# Patient Record
Sex: MALE | Race: WHITE | NOT HISPANIC OR LATINO | Employment: FULL TIME | URBAN - METROPOLITAN AREA
[De-identification: names, ages, dates, MRNs, and addresses within clinical notes are randomized per-mention and may not be internally consistent; named-entity substitution may affect disease eponyms.]

---

## 2017-02-22 ENCOUNTER — TELEPHONE (OUTPATIENT)
Dept: FAMILY MEDICINE | Facility: CLINIC | Age: 62
End: 2017-02-22

## 2017-02-22 DIAGNOSIS — Z12.11 SCREEN FOR COLON CANCER: Primary | ICD-10-CM

## 2017-02-22 NOTE — TELEPHONE ENCOUNTER
I put in order for procedure.  Given him the numbers listed [facility] and [facility].    Thanks    Tim Soliman MD

## 2017-02-22 NOTE — TELEPHONE ENCOUNTER
Panel Management Review      Patient has the following on his problem list:     Diabetes    ASA: Failed    Last A1C  Lab Results   Component Value Date    A1C 12.5 03/14/2016    A1C 10.8 02/27/2015     A1C tested: Failed    Last LDL:    Lab Results   Component Value Date    CHOL 221 02/27/2015     Lab Results   Component Value Date    HDL 99 02/27/2015     Lab Results   Component Value Date     02/27/2015     Lab Results   Component Value Date    TRIG 71 02/27/2015     No results found for: CHOLHDLRATIO  No results found for: NHDL    Is the patient on a Statin? NO             Is the patient on Aspirin? YES    Medications     Salicylates    aspirin 81 MG tablet          Last three blood pressure readings:  BP Readings from Last 3 Encounters:   04/25/16 135/82   03/28/16 112/71   03/14/16 129/80       Date of last diabetes office visit: [facility]     Tobacco History:     History   Smoking Status     Never Smoker   Smokeless Tobacco     Not on file           Composite cancer screening  Chart review shows that this patient is due/due soon for the following Colonoscopy  Summary:    Patient is due/failing the following:   COLONOSCOPY    Action needed:   Patient needs referral/order: colonoscopy order pended    Type of outreach:    Phone, spoke to patient.  he will call and schedule when he figures out his work schedule.    Questions for provider review:    Please sign colonoscopy referral. Thanks                                                                                                                                    Luz Elena Gomez [facility]       Chart routed to Provider .

## 2017-02-28 ENCOUNTER — OFFICE VISIT (OUTPATIENT)
Dept: FAMILY MEDICINE | Facility: CLINIC | Age: 62
End: 2017-02-28
Payer: COMMERCIAL

## 2017-02-28 VITALS
SYSTOLIC BLOOD PRESSURE: 124 MMHG | WEIGHT: 175 LBS | OXYGEN SATURATION: 98 % | TEMPERATURE: 97.3 F | BODY MASS INDEX: 23.19 KG/M2 | HEART RATE: 82 BPM | DIASTOLIC BLOOD PRESSURE: 75 MMHG | HEIGHT: 73 IN

## 2017-02-28 DIAGNOSIS — Z79.4 TYPE 2 DIABETES MELLITUS WITHOUT COMPLICATION, WITH LONG-TERM CURRENT USE OF INSULIN (H): ICD-10-CM

## 2017-02-28 DIAGNOSIS — J06.9 UPPER RESPIRATORY TRACT INFECTION, UNSPECIFIED TYPE: Primary | ICD-10-CM

## 2017-02-28 DIAGNOSIS — E11.9 TYPE 2 DIABETES MELLITUS WITHOUT COMPLICATION, WITH LONG-TERM CURRENT USE OF INSULIN (H): ICD-10-CM

## 2017-02-28 PROCEDURE — 99213 OFFICE O/P EST LOW 20 MIN: CPT | Performed by: PHYSICIAN ASSISTANT

## 2017-02-28 NOTE — PROGRESS NOTES
"  SUBJECTIVE:                                                    José Manuel Jernigan is a 61 year old male who presents to clinic today for the following health issues:      Acute Illness   Acute illness concerns: Cough  Onset: 4 days    Fever: no     Chills/Sweats: YES    Headache (location?): YES    Sinus Pressure:YES    Conjunctivitis:  no    Ear Pain: no    Rhinorrhea: YES    Congestion: YES    Sore Throat: no     Cough: YES-productive of yellow sputum    Wheeze: YES    Decreased Appetite: YES    Nausea: YES    Vomiting: no    Diarrhea:  no    Dysuria/Freq.: no    Fatigue/Achiness: YES    Sick/Strep Exposure: no     Therapies Tried and outcome: Rest and fluids   Moved into chest right away   No hx of lung problems.    Dizzy and nausea-feeling off.  Nausea was seperate.  No appetite   Tried asa once  Sugars high, always high with illness.  Not going low.          Problem list and histories reviewed & adjusted, as indicated.  Additional history: as documented    Patient Active Problem List   Diagnosis     Advanced directives, counseling/discussion     History of diabetes mellitus     Type 2 diabetes mellitus without complication, with long-term current use of insulin (H)     Past Surgical History   Procedure Laterality Date     Ent surgery       Tonsillectomy       Social History   Substance Use Topics     Smoking status: Never Smoker     Smokeless tobacco: Not on file     Alcohol use Yes     History reviewed. No pertinent family history.        Reviewed and updated as needed this visit by clinical staff  Tobacco  Allergies  Meds  Med Hx  Surg Hx  Fam Hx  Soc Hx      Reviewed and updated as needed this visit by Provider         ROS:  As above    OBJECTIVE:                                                    /75 (BP Location: Left arm, Patient Position: Chair, Cuff Size: Adult Regular)  Pulse 82  Temp 97.3  F (36.3  C) (Oral)  Ht 6' 1\" (1.854 m)  Wt 175 lb (79.4 kg)  SpO2 98%  BMI 23.09 kg/m2  Body mass " index is 23.09 kg/(m^2).  GENERAL: healthy, alert and no distress  HENT: both ears: occluded with wax, oropharynx clear, oral mucous membranes moist and sinuses: not tender  NECK: no adenopathy and no asymmetry, masses, or scars  RESP: lungs clear to auscultation - no rales, rhonchi or wheezes  CV: regular rates and rhythm, normal S1 S2, no S3 or S4 and no murmur, click or rub    Diagnostic Test Results:  none      ASSESSMENT/PLAN:                                                      1. Upper respiratory tract infection, unspecified type  Continue symptomatic treatment.  return to clinic if not improving.       2. Type 2 diabetes mellitus without complication, with long-term current use of insulin (H)  [facility]  Per pt sugars not low as cause of dizziness.            Dottie Grewal PA-C  [facility]

## 2017-02-28 NOTE — LETTER
[address] [address]  Phone: [phone]    February 28, 2017        José Manuel Jernigan  [address] [facility] [address]          To whom it may concern:    RE: José Manuel Jernigan    Patient was seen and treated today at our clinic and missed work.    Please contact me for questions or concerns.      Sincerely,        Dottie Grewal PA-C

## 2017-02-28 NOTE — NURSING NOTE
"Chief Complaint   Patient presents with     URI       Initial /75 (BP Location: Left arm, Patient Position: Chair, Cuff Size: Adult Regular)  Pulse 82  Temp 97.3  F (36.3  C) (Oral)  Ht 6' 1\" (1.854 m)  Wt 175 lb (79.4 kg)  SpO2 98%  BMI 23.09 kg/m2 Estimated body mass index is 23.09 kg/(m^2) as calculated from the following:    Height as of this encounter: 6' 1\" (1.854 m).    Weight as of this encounter: 175 lb (79.4 kg).  Medication Reconciliation: complete   Cecy Gonzalez CMA       "

## 2017-02-28 NOTE — MR AVS SNAPSHOT
"              After Visit Summary   2017    José Manuel Jernigan    MRN: [id number]           Patient Information     Date Of Birth          1955        Visit Information        Provider Department      2017 5:40 PM Dottie Grewal PA-C [facility]        Today's Diagnoses     Upper respiratory tract infection, unspecified type    -  1    Type 2 diabetes mellitus without complication, with long-term current use of insulin (H)           Follow-ups after your visit        Who to contact     If you have questions or need follow up information about today's clinic visit or your schedule please contact [facility] directly at [phone].  Normal or non-critical lab and imaging results will be communicated to you by MyChart, letter or phone within 4 business days after the clinic has received the results. If you do not hear from us within 7 days, please contact the clinic through MyChart or phone. If you have a critical or abnormal lab result, we will notify you by phone as soon as possible.  Submit refill requests through [employer] or call your pharmacy and they will forward the refill request to us. Please allow 3 business days for your refill to be completed.          Additional Information About Your Visit        MyChart Information     [employer] lets you send messages to your doctor, view your test results, renew your prescriptions, schedule appointments and more. To sign up, go to [email or web address] . Click on \"Log in\" on the left side of the screen, which will take you to the Welcome page. Then click on \"Sign up Now\" on the right side of the page.     You will be asked to enter the access code listed below, as well as some personal information. Please follow the directions to create your username and password.     Your access code is: [email or web address]  Expires: 2017  6:11 PM     Your access code will  in 90 days. If you need help or a new " "code, please call your [facility] or [phone].        Care EveryWhere ID     This is your Care EveryWhere ID. This could be used by other organizations to access your [facility] medical records  [id number]        Your Vitals Were     Pulse Temperature Height Pulse Oximetry BMI (Body Mass Index)       82 97.3  F (36.3  C) (Oral) 6' 1\" (1.854 m) 98% 23.09 kg/m2        Blood Pressure from Last 3 Encounters:   02/28/17 124/75   04/25/16 135/82   03/28/16 112/71    Weight from Last 3 Encounters:   02/28/17 175 lb (79.4 kg)   04/25/16 185 lb (83.9 kg)   03/28/16 190 lb (86.2 kg)              Today, you had the following     No orders found for display       Primary Care Provider Office Phone # Fax #    Tim Soliman [phone]       [facility] [address] [facility] [address]        Thank you!     Thank you for choosing [facility]  for your care. Our goal is always to provide you with excellent care. Hearing back from our patients is one way we can continue to improve our services. Please take a few minutes to complete the written survey that you may receive in the mail after your visit with us. Thank you!             Your Updated Medication List - Protect others around you: Learn how to safely use, store and throw away your medicines at www.disposemymeds.org.          This list is accurate as of: 2/28/17  6:11 PM.  Always use your most recent med list.                   Brand Name Dispense Instructions for use    aspirin 81 MG tablet     90 tablet    Take 1 tablet (81 mg) by mouth daily       INSULIN ASPART SC      26 units at bedtime unsure of which insulin though       MULTIVITAMIN PO      Take by mouth daily         "

## 2017-06-14 ENCOUNTER — TELEPHONE (OUTPATIENT)
Dept: FAMILY MEDICINE | Facility: CLINIC | Age: 62
End: 2017-06-14

## 2017-06-14 NOTE — LETTER
June 14, 2017    José Manuel Mejiaolegariojose d  [address] [facility] [address]    Dear José Manuel,    I care about your health and have reviewed your health plan. I have reviewed your medical conditions, medication list, and lab results and am making recommendations based on this review, to better manage your health.    You are in particular need of attention regarding:  -Diabetes    I am recommending that you:  -Please sign this Release of Information form.  This will allow us to get [facility] so we can also update our system for your Diabetic Health.   Please mail the completed for back to us.    Here is a list of Health Maintenance topics that are due now or due soon:  Health Maintenance Due   Topic Date Due     Diabetic Foot Exam - yearly  11/23/1956     Eye Exam - yearly  11/23/1956     Diabetic Education - yearly  11/23/1956     Microalbumin Lab - yearly  11/23/1956     Hepatitis C Screening  11/23/1973     Tetanus Vaccine - every 10 years  04/03/2006     Pneumonia Vaccine (1) 12/23/2015     Cholesterol Lab - yearly  02/27/2016     A1C (Diabetes) Lab - every 6 months  09/14/2016     Colon Cancer Screening - every 10 years.  01/01/2017     Thyroid Function Lab (TSH) - every 2 years  03/13/2017     Creatinine Lab - yearly  05/08/2017   Please call us at [phone] (or use [employer]) to address the above recommendations.     Thank you for trusting [facility] and we appreciate the opportunity to serve you.  We look forward to supporting your healthcare needs in the future.    Healthy Regards,    DINA VelasquezL

## 2017-06-22 ENCOUNTER — TELEPHONE (OUTPATIENT)
Dept: FAMILY MEDICINE | Facility: CLINIC | Age: 62
End: 2017-06-22

## 2017-06-22 DIAGNOSIS — Z12.11 SCREEN FOR COLON CANCER: Primary | ICD-10-CM

## 2017-06-22 NOTE — TELEPHONE ENCOUNTER
Reason for call:  Patient reporting a symptom    Symptom or request: Abdominal pain    Additional comments: TOBIAS Baxter triaging patient.    Phone Number patient can be reached at:  Home number on file [phone] (home)        Call taken on 6/22/2017 at 12:44 PM by Darlin Kramer

## 2017-06-22 NOTE — TELEPHONE ENCOUNTER
Patient called and was triaged for abdominal pain.   I month abdominal pain, discussed red flags to watch for and scheduled for Monday with Coni Jensen.    Patient also notes he is due for colonoscopy.    He has no strong opinion on where he goes.    Routed to Dr. Soliman for colonoscopy referral.    Isabelle López RN  [facility]

## 2017-06-22 NOTE — TELEPHONE ENCOUNTER
I did referral.  Please give patient the number to call, listed in referral.  Also advise him he will want to hold his aspirin for one week prior to test and hold the insulin night before test is done.    Thanks.    Tim Soliman MD

## 2017-06-22 NOTE — TELEPHONE ENCOUNTER
Red flag call taken, patient reports abdominal pain level 5-6 out of 10 with bloating and tightness for the past month.   Initially was more intermittent, feels bloated for a couple of hours, pain is located in upper abdomen, states feels like gas but he is not belching.   Does complain of feeling dizzy and nauseated at times.   Pain is more constant now for the past 2 weeks.   Denies vomiting, fever, diarrhea or constipation.   Is able to eat and drink, can carry on with his day and is not doubled over in pain.   Is just bothersome.   Reports fatigue, thinks due to the length of time he's been dealing with this.   Denies pain in chest, upper abdomen is tight.   Denies pain radiating down legs or in jaw or shoulder.   States he has not tried antacids as does not seem to be gas.   Drinking water makes him feel better.  Eating does not really affect it.    He is not interested in coming in today, has availability on Monday or Tuesday.   Scheduled.  Advised him to call 911 if acutely worse, hard to walk, vomiting or stooling blood.   Seek evaluation in ER with someone else to drive if increased weakness, not urinating at least once in 8-12 hours.       After our conversation, I do see he has history of liver issues and GERD, see note from [facility] from April 2016.    Patient verbalized understanding of and agreement with plan.  Source:  Telephone Triage Protocols for Nurses, [place], 5th ed, abdominal pain, adult    Isabelle López, RN  [facility]

## 2017-06-23 NOTE — TELEPHONE ENCOUNTER
Spoke with patient and informed.  He is scheduled for September, due to schedule and transportation.

## 2017-06-26 ENCOUNTER — OFFICE VISIT (OUTPATIENT)
Dept: FAMILY MEDICINE | Facility: CLINIC | Age: 62
End: 2017-06-26
Payer: COMMERCIAL

## 2017-06-26 VITALS
BODY MASS INDEX: 24.01 KG/M2 | SYSTOLIC BLOOD PRESSURE: 138 MMHG | HEART RATE: 73 BPM | OXYGEN SATURATION: 100 % | WEIGHT: 182 LBS | TEMPERATURE: 98.2 F | DIASTOLIC BLOOD PRESSURE: 83 MMHG

## 2017-06-26 DIAGNOSIS — Z79.4 TYPE 2 DIABETES MELLITUS WITHOUT COMPLICATION, WITH LONG-TERM CURRENT USE OF INSULIN (H): ICD-10-CM

## 2017-06-26 DIAGNOSIS — R10.84 ABDOMINAL PAIN, GENERALIZED: Primary | ICD-10-CM

## 2017-06-26 DIAGNOSIS — E11.9 TYPE 2 DIABETES MELLITUS WITHOUT COMPLICATION, WITH LONG-TERM CURRENT USE OF INSULIN (H): ICD-10-CM

## 2017-06-26 DIAGNOSIS — L21.0 PITYRIASIS: ICD-10-CM

## 2017-06-26 PROCEDURE — 99214 OFFICE O/P EST MOD 30 MIN: CPT | Performed by: PHYSICIAN ASSISTANT

## 2017-06-26 ASSESSMENT — PAIN SCALES - GENERAL: PAINLEVEL: MODERATE PAIN (5)

## 2017-06-26 NOTE — PROGRESS NOTES
SUBJECTIVE:                                                    José Manuel Jernigan is a 61 year old male who presents to clinic today for the following health issues:      Concern - Follow-up Abdominal Pain   Onset: 1 month    Description:   Pt said that his pain is still the same and that nothing has changed. Pt is having some nausea and pressure in his abdominal area. His bowel movements have been normal. Pt said that he has been also having no energy which is unusual for him.     Intensity: moderate    Progression of Symptoms:  same    Accompanying Signs & Symptoms:  Pt stated that is is worse in the morning.    Previous history of similar problem:   none    Precipitating factors:   Worsened by: nothing    Alleviating factors:  Improved by: Drinking water    Therapies Tried and outcome: None.    It started out of the blue like he had gas. The pressure radiates around the whole belly.   Feels gassy, but no excessive gas.   No affect from food. Less pressure with drinking water.   Has not tried any over the counter meds.   Soft BM every other day. No blood in the stool.   Increase in BS since feeling bad.   [facility] He does not recall his last A1C. Reports his diabetes has been stable. Our last A1C was elevated here. [facility]sa not been [facility] by the [facility] in about a year though.       Problem list and histories reviewed & adjusted, as indicated.  Additional history: as documented    Patient Active Problem List   Diagnosis     Advanced directives, counseling/discussion     History of diabetes mellitus     Type 2 diabetes mellitus without complication, with long-term current use of insulin (H)     Past Surgical History:   Procedure Laterality Date     ENT SURGERY      Tonsillectomy       Social History   Substance Use Topics     Smoking status: Never Smoker     Smokeless tobacco: Not on file     Alcohol use Yes     History reviewed. No pertinent family history.        Reviewed and updated as needed this visit by  clinical staff  Tobacco  Allergies  Meds  Med Hx  Surg Hx  Fam Hx  Soc Hx      Reviewed and updated as needed this visit by Provider         ROS:  Constitutional, HEENT, cardiovascular, pulmonary, gi and gu systems are negative, except as otherwise noted.    OBJECTIVE:     /83 (BP Location: Left arm, Patient Position: Chair, Cuff Size: Adult Regular)  Pulse 73  Temp 98.2  F (36.8  C) (Oral)  Wt 182 lb (82.6 kg)  SpO2 100%  BMI 24.01 kg/m2  Body mass index is 24.01 kg/(m^2).  GENERAL: healthy, alert and no distress  ABDOMEN: soft, nontender, no hepatosplenomegaly, no masses and bowel sounds normal  SKIN: salmon colored macules noted across abdomen, chest and lower back. No obvious herald patch.     Diagnostic Test Results:  none     ASSESSMENT/PLAN:       ICD-10-CM    1. Abdominal pain, generalized R10.84 ranitidine (ZANTAC) 150 MG tablet   2. Type 2 diabetes mellitus without complication, with long-term current use of insulin (H) E11.9     Z79.4    3. Pityriasis L21.0    Rash is new, patient had not noticed this prior. Pityriasis rosea vs versicolor. Will monitor for now. If no resolution in 4 weeks can consider starting treatment for versicolor.   Abdominal pain is persistent, has not tried any medications. Benign exam. Try zantac, need to consider gastroparesis as a possible cause as well with his history of uncontrolled DM.     FUTURE APPOINTMENTS:       - Follow-up visit in 2 weeks if not improving.     Coni Jensen PA-C  [facility]

## 2017-06-26 NOTE — PATIENT INSTRUCTIONS
If not improving with the zantac in 2 weeks return to clinic      Watch rash, if not gone in 4 weeks,w e should try a fungal cream.

## 2017-06-26 NOTE — NURSING NOTE
"Chief Complaint   Patient presents with     Bloated     recheck     Health Maintenance     Foot Exam, Eye Exam, Diabetic Ed, Microalbumin, Hep C, Tetanus, Pneumo, Lipid, A1C, Colonoscopy, TSH, and Creatinine       Initial /83 (BP Location: Left arm, Patient Position: Chair, Cuff Size: Adult Regular)  Pulse 73  Temp 98.2  F (36.8  C) (Oral)  Wt 182 lb (82.6 kg)  SpO2 100%  BMI 24.01 kg/m2 Estimated body mass index is 24.01 kg/(m^2) as calculated from the following:    Height as of 2/28/17: 6' 1\" (1.854 m).    Weight as of this encounter: 182 lb (82.6 kg).  Medication Reconciliation: romeo Samayoa MA      "

## 2017-06-26 NOTE — MR AVS SNAPSHOT
"              After Visit Summary   6/26/2017    José Manuel Jernigan    MRN: [id number]           Patient Information     Date Of Birth          1955        Visit Information        Provider Department      6/26/2017 10:20 AM Coni Jensen PA-C [facility]        Today's Diagnoses     Abdominal pain, generalized    -  1    Type 2 diabetes mellitus without complication, with long-term current use of insulin (H)          Care Instructions    If not improving with the zantac in 2 weeks return to clinic      Watch rash, if not gone in 4 weeks,w e should try a fungal cream.           Follow-ups after your visit        Who to contact     If you have questions or need follow up information about today's clinic visit or your schedule please contact [facility] directly at [phone].  Normal or non-critical lab and imaging results will be communicated to you by MyChart, letter or phone within 4 business days after the clinic has received the results. If you do not hear from us within 7 days, please contact the clinic through MyChart or phone. If you have a critical or abnormal lab result, we will notify you by phone as soon as possible.  Submit refill requests through [employer] or call your pharmacy and they will forward the refill request to us. Please allow 3 business days for your refill to be completed.          Additional Information About Your Visit        MyChart Information     [employer] lets you send messages to your doctor, view your test results, renew your prescriptions, schedule appointments and more. To sign up, go to [email or web address] . Click on \"Log in\" on the left side of the screen, which will take you to the Welcome page. Then click on \"Sign up Now\" on the right side of the page.     You will be asked to enter the access code listed below, as well as some personal information. Please follow the directions to create your username and password.   "   Your access code is: [email or web address]  Expires: 2017 10:43 AM     Your access code will  in 90 days. If you need help or a new code, please call your [facility] clinic or [phone].        Care EveryWhere ID     This is your Care EveryWhere ID. This could be used by other organizations to access your [facility] medical records  [id number]        Your Vitals Were     Pulse Temperature Pulse Oximetry BMI (Body Mass Index)          73 98.2  F (36.8  C) (Oral) 100% 24.01 kg/m2         Blood Pressure from Last 3 Encounters:   17 138/83   17 124/75   16 135/82    Weight from Last 3 Encounters:   17 182 lb (82.6 kg)   17 175 lb (79.4 kg)   16 185 lb (83.9 kg)              Today, you had the following     No orders found for display         Today's Medication Changes          These changes are accurate as of: 17 10:43 AM.  If you have any questions, ask your nurse or doctor.               Start taking these medicines.        Dose/Directions    ranitidine 150 MG tablet   Commonly known as:  ZANTAC   Used for:  Abdominal pain, generalized   Started by:  Coni Jensen PA-C        Dose:  150 mg   Take 1 tablet (150 mg) by mouth 2 times daily   Quantity:  60 tablet   Refills:  1            Where to get your medicines      These medications were sent to [facility] [place], [place] - [address]  [address] [place] [facility] [address]     Phone:  [phone]     ranitidine 150 MG tablet                Primary Care Provider Office Phone # Fax #    Tim Soliman [phone]       [facility] [address] [facility] [address]        Equal Access to Services     TASHA WILLETT : Qing Julien, waericda luqadaha, qaybta kaalmada dmitri, karla gordon. So [facility] [phone].    ATENCIÓN: Si habla español, tiene a akins disposición servicios gratuitos de  asistencia lingüística. Hemanth al [phone].    We comply with applicable federal civil rights laws and [place] laws. We do not discriminate on the basis of race, color, national origin, age, disability sex, sexual orientation or gender identity.            Thank you!     Thank you for choosing [facility]  for your care. Our goal is always to provide you with excellent care. Hearing back from our patients is one way we can continue to improve our services. Please take a few minutes to complete the written survey that you may receive in the mail after your visit with us. Thank you!             Your Updated Medication List - Protect others around you: Learn how to safely use, store and throw away your medicines at www.disposemymeds.org.          This list is accurate as of: 6/26/17 10:43 AM.  Always use your most recent med list.                   Brand Name Dispense Instructions for use Diagnosis    aspirin 81 MG tablet     90 tablet    Take 1 tablet (81 mg) by mouth daily    Health care maintenance       INSULIN ASPART SC      26 units at bedtime unsure of which insulin though        MULTIVITAMIN PO      Take by mouth daily        ranitidine 150 MG tablet    ZANTAC    60 tablet    Take 1 tablet (150 mg) by mouth 2 times daily    Abdominal pain, generalized

## 2017-07-12 ENCOUNTER — RADIANT APPOINTMENT (OUTPATIENT)
Dept: GENERAL RADIOLOGY | Facility: CLINIC | Age: 62
End: 2017-07-12
Attending: FAMILY MEDICINE
Payer: COMMERCIAL

## 2017-07-12 ENCOUNTER — OFFICE VISIT (OUTPATIENT)
Dept: FAMILY MEDICINE | Facility: CLINIC | Age: 62
End: 2017-07-12
Payer: COMMERCIAL

## 2017-07-12 VITALS
SYSTOLIC BLOOD PRESSURE: 116 MMHG | OXYGEN SATURATION: 99 % | WEIGHT: 181.13 LBS | BODY MASS INDEX: 23.9 KG/M2 | DIASTOLIC BLOOD PRESSURE: 70 MMHG | HEART RATE: 83 BPM | TEMPERATURE: 97.6 F

## 2017-07-12 DIAGNOSIS — Z79.4 TYPE 2 DIABETES MELLITUS WITHOUT COMPLICATION, WITH LONG-TERM CURRENT USE OF INSULIN (H): ICD-10-CM

## 2017-07-12 DIAGNOSIS — R63.4 LOSS OF WEIGHT: ICD-10-CM

## 2017-07-12 DIAGNOSIS — E78.5 HYPERLIPIDEMIA LDL GOAL <100: ICD-10-CM

## 2017-07-12 DIAGNOSIS — Z11.9 SCREENING EXAMINATION FOR INFECTIOUS DISEASE: ICD-10-CM

## 2017-07-12 DIAGNOSIS — E11.9 TYPE 2 DIABETES MELLITUS WITHOUT COMPLICATION, WITH LONG-TERM CURRENT USE OF INSULIN (H): ICD-10-CM

## 2017-07-12 DIAGNOSIS — R53.83 FATIGUE, UNSPECIFIED TYPE: ICD-10-CM

## 2017-07-12 DIAGNOSIS — G47.00 INSOMNIA, UNSPECIFIED TYPE: Primary | ICD-10-CM

## 2017-07-12 LAB
ALBUMIN SERPL-MCNC: 3.9 G/DL (ref 3.4–5)
ALP SERPL-CCNC: 82 U/L (ref 40–150)
ALT SERPL W P-5'-P-CCNC: 23 U/L (ref 0–70)
ANION GAP SERPL CALCULATED.3IONS-SCNC: 6 MMOL/L (ref 3–14)
AST SERPL W P-5'-P-CCNC: 9 U/L (ref 0–45)
BASOPHILS # BLD AUTO: 0 10E9/L (ref 0–0.2)
BASOPHILS NFR BLD AUTO: 0.7 %
BILIRUB SERPL-MCNC: 0.4 MG/DL (ref 0.2–1.3)
BUN SERPL-MCNC: 13 MG/DL (ref 7–30)
CALCIUM SERPL-MCNC: 8.8 MG/DL (ref 8.5–10.1)
CHLORIDE SERPL-SCNC: 101 MMOL/L (ref 94–109)
CHOLEST SERPL-MCNC: 231 MG/DL
CO2 SERPL-SCNC: 29 MMOL/L (ref 20–32)
CREAT SERPL-MCNC: 0.72 MG/DL (ref 0.66–1.25)
DIFFERENTIAL METHOD BLD: NORMAL
EOSINOPHIL # BLD AUTO: 0.1 10E9/L (ref 0–0.7)
EOSINOPHIL NFR BLD AUTO: 2.8 %
ERYTHROCYTE [DISTWIDTH] IN BLOOD BY AUTOMATED COUNT: 12.8 % (ref 10–15)
GFR SERPL CREATININE-BSD FRML MDRD: ABNORMAL ML/MIN/1.7M2
GLUCOSE SERPL-MCNC: 382 MG/DL (ref 70–99)
HBA1C MFR BLD: 10.9 % (ref 4.3–6)
HCT VFR BLD AUTO: 45.3 % (ref 40–53)
HCV AB SERPL QL IA: NORMAL
HDLC SERPL-MCNC: 85 MG/DL
HGB BLD-MCNC: 15.1 G/DL (ref 13.3–17.7)
LDLC SERPL CALC-MCNC: 129 MG/DL
LYMPHOCYTES # BLD AUTO: 1.3 10E9/L (ref 0.8–5.3)
LYMPHOCYTES NFR BLD AUTO: 27.5 %
MCH RBC QN AUTO: 30.2 PG (ref 26.5–33)
MCHC RBC AUTO-ENTMCNC: 33.3 G/DL (ref 31.5–36.5)
MCV RBC AUTO: 91 FL (ref 78–100)
MONOCYTES # BLD AUTO: 0.6 10E9/L (ref 0–1.3)
MONOCYTES NFR BLD AUTO: 13.5 %
NEUTROPHILS # BLD AUTO: 2.5 10E9/L (ref 1.6–8.3)
NEUTROPHILS NFR BLD AUTO: 55.5 %
NONHDLC SERPL-MCNC: 146 MG/DL
PLATELET # BLD AUTO: 250 10E9/L (ref 150–450)
POTASSIUM SERPL-SCNC: 4.4 MMOL/L (ref 3.4–5.3)
PROT SERPL-MCNC: 7 G/DL (ref 6.8–8.8)
RBC # BLD AUTO: 5 10E12/L (ref 4.4–5.9)
SODIUM SERPL-SCNC: 136 MMOL/L (ref 133–144)
TRIGL SERPL-MCNC: 85 MG/DL
TSH SERPL DL<=0.005 MIU/L-ACNC: 1.12 MU/L (ref 0.4–4)
WBC # BLD AUTO: 4.6 10E9/L (ref 4–11)

## 2017-07-12 PROCEDURE — 36415 COLL VENOUS BLD VENIPUNCTURE: CPT | Performed by: FAMILY MEDICINE

## 2017-07-12 PROCEDURE — 80061 LIPID PANEL: CPT | Performed by: FAMILY MEDICINE

## 2017-07-12 PROCEDURE — 86803 HEPATITIS C AB TEST: CPT | Performed by: FAMILY MEDICINE

## 2017-07-12 PROCEDURE — 83036 HEMOGLOBIN GLYCOSYLATED A1C: CPT | Performed by: FAMILY MEDICINE

## 2017-07-12 PROCEDURE — 71020 XR CHEST 2 VW: CPT

## 2017-07-12 PROCEDURE — 99214 OFFICE O/P EST MOD 30 MIN: CPT | Performed by: FAMILY MEDICINE

## 2017-07-12 PROCEDURE — 80050 GENERAL HEALTH PANEL: CPT | Performed by: FAMILY MEDICINE

## 2017-07-12 ASSESSMENT — PAIN SCALES - GENERAL: PAINLEVEL: NO PAIN (0)

## 2017-07-12 NOTE — LETTER
[facility]  [address] [place]  [address]  [phone]        July 17, 2017    José Manuel Jernigan  [address] [facility] [address]        Dear José Manuel,    Your cholesterol is moderately high.  Consider starting medications for this.  If interested, let me know.     The hemoglobin a1c we checked is very high.  Advise follow up with your diabetes provider soon.     Other labs are okay.     Follow up in clinic if symptoms not resolving.     Results for orders placed or performed in visit on 07/12/17   Hepatitis C antibody   Result Value Ref Range    Hepatitis C Antibody  NR     Nonreactive   Assay performance characteristics have not been established for newborns,   infants, and children     Lipid panel reflex to direct LDL   Result Value Ref Range    Cholesterol 231 (H) <200 mg/dL    Triglycerides 85 <150 mg/dL    HDL Cholesterol 85 >39 mg/dL    LDL Cholesterol Calculated 129 (H) <100 mg/dL    Non HDL Cholesterol 146 (H) <130 mg/dL   Hemoglobin A1c   Result Value Ref Range    Hemoglobin A1C 10.9 (H) 4.3 - 6.0 %   TSH with free T4 reflex   Result Value Ref Range    TSH 1.12 0.40 - 4.00 mU/L   CBC with platelets differential   Result Value Ref Range    WBC 4.6 4.0 - 11.0 10e9/L    RBC Count 5.00 4.4 - 5.9 10e12/L    Hemoglobin 15.1 13.3 - 17.7 g/dL    Hematocrit 45.3 40.0 - 53.0 %    MCV 91 78 - 100 fl    MCH 30.2 26.5 - 33.0 pg    MCHC 33.3 31.5 - 36.5 g/dL    RDW 12.8 10.0 - 15.0 %    Platelet Count 250 150 - 450 10e9/L    Diff Method Automated Method     % Neutrophils 55.5 %    % Lymphocytes 27.5 %    % Monocytes 13.5 %    % Eosinophils 2.8 %    % Basophils 0.7 %    Absolute Neutrophil 2.5 1.6 - 8.3 10e9/L    Absolute Lymphocytes 1.3 0.8 - 5.3 10e9/L    Absolute Monocytes 0.6 0.0 - 1.3 10e9/L    Absolute Eosinophils 0.1 0.0 - 0.7 10e9/L    Absolute Basophils 0.0 0.0 - 0.2 10e9/L   Comprehensive metabolic panel   Result Value Ref Range    Sodium 136 133 - 144  mmol/L    Potassium 4.4 3.4 - 5.3 mmol/L    Chloride 101 94 - 109 mmol/L    Carbon Dioxide 29 20 - 32 mmol/L    Anion Gap 6 3 - 14 mmol/L    Glucose 382 (H) 70 - 99 mg/dL    Urea Nitrogen 13 7 - 30 mg/dL    Creatinine 0.72 0.66 - 1.25 mg/dL    GFR Estimate >90  Non  GFR Calc   >60 mL/min/1.7m2    GFR Estimate If Black >90   GFR Calc   >60 mL/min/1.7m2    Calcium 8.8 8.5 - 10.1 mg/dL    Bilirubin Total 0.4 0.2 - 1.3 mg/dL    Albumin 3.9 3.4 - 5.0 g/dL    Protein Total 7.0 6.8 - 8.8 g/dL    Alkaline Phosphatase 82 40 - 150 U/L    ALT 23 0 - 70 U/L    AST 9 0 - 45 U/L       If you have any questions please call the clinic at [phone].    Sincerely,    Tim ZULETAL

## 2017-07-12 NOTE — MR AVS SNAPSHOT
"              After Visit Summary   7/12/2017    José Manuel Jernigan    MRN: [id number]           Patient Information     Date Of Birth          1955        Visit Information        Provider Department      7/12/2017 6:40 AM Tim Soliman MD [facility]        Today's Diagnoses     Screening examination for infectious disease    -  1    Type 2 diabetes mellitus without complication, with long-term current use of insulin (H)        Fatigue, unspecified type        Hyperlipidemia LDL goal <100        Loss of weight          Care Instructions    Try melatonin at bedtime, start about 3 mg     Can work up on that    Max is 10 mg    We will send you other results    Follow up in clinic if symptoms not improving in next weeks          Follow-ups after your visit        Who to contact     If you have questions or need follow up information about today's clinic visit or your schedule please contact [facility] directly at [phone].  Normal or non-critical lab and imaging results will be communicated to you by MyChart, letter or phone within 4 business days after the clinic has received the results. If you do not hear from us within 7 days, please contact the clinic through MyChart or phone. If you have a critical or abnormal lab result, we will notify you by phone as soon as possible.  Submit refill requests through Coolest [employer]er or call your pharmacy and they will forward the refill request to us. Please allow 3 business days for your refill to be completed.          Additional Information About Your Visit        MyChart Information     Coolest Cooler lets you send messages to your doctor, view your test results, renew your prescriptions, schedule appointments and more. To sign up, go to [email or web address] . Click on \"Log in\" on the left side of the screen, which will take you to the Welcome page. Then click on \"Sign up Now\" on the right side of the page.     You will be " asked to enter the access code listed below, as well as some personal information. Please follow the directions to create your username and password.     Your access code is: [email or web address]  Expires: 2017 10:43 AM     Your access code will  in 90 days. If you need help or a new code, please call your [facility] clinic or [phone].        Care EveryWhere ID     This is your Care EveryWhere ID. This could be used by other organizations to access your [facility] medical records  [id number]        Your Vitals Were     Pulse Temperature Pulse Oximetry BMI (Body Mass Index)          83 97.6  F (36.4  C) (Oral) 99% 23.9 kg/m2         Blood Pressure from Last 3 Encounters:   17 116/70   17 138/83   17 124/75    Weight from Last 3 Encounters:   17 181 lb 2 oz (82.2 kg)   17 182 lb (82.6 kg)   17 175 lb (79.4 kg)              We Performed the Following     CBC with platelets differential     Comprehensive metabolic panel     Hemoglobin A1c     Hepatitis C antibody     Lipid panel reflex to direct LDL     TSH with free T4 reflex        Primary Care Provider Office Phone # Fax #    Tim Soliman [phone]       [facility] [address] [facility] [address]        Equal Access to Services     TASHA WILLETT : Hadii aad ku hadasho Soomaali, waaxda luqadaha, qaybta kaalmada adeegyada, karla gordon. So [facility] [phone].    ATENCIÓN: Si habla español, tiene a akins disposición servicios gratuitos de asistencia lingüística. Llame al [phone].    We comply with applicable federal civil rights laws and [place] laws. We do not discriminate on the basis of race, color, national origin, age, disability sex, sexual orientation or gender identity.            Thank you!     Thank you for choosing [facility]  for your care. Our goal is always to provide you with excellent care. Hearing back from  our patients is one way we can continue to improve our services. Please take a few minutes to complete the written survey that you may receive in the mail after your visit with us. Thank you!             Your Updated Medication List - Protect others around you: Learn how to safely use, store and throw away your medicines at www.disposemymeds.org.          This list is accurate as of: 7/12/17  7:41 AM.  Always use your most recent med list.                   Brand Name Dispense Instructions for use Diagnosis    aspirin 81 MG tablet     90 tablet    Take 1 tablet (81 mg) by mouth daily    Health care maintenance       INSULIN ASPART SC      26 units at bedtime unsure of which insulin though        MULTIVITAMIN PO      Take by mouth daily        ranitidine 150 MG tablet    ZANTAC    60 tablet    Take 1 tablet (150 mg) by mouth 2 times daily    Abdominal pain, generalized

## 2017-07-12 NOTE — NURSING NOTE
"Chief Complaint   Patient presents with     Sleep Problem       Initial /70 (BP Location: Left arm, Patient Position: Chair, Cuff Size: Adult Regular)  Pulse 83  Temp 97.6  F (36.4  C) (Oral)  Wt 181 lb 2 oz (82.2 kg)  SpO2 99%  BMI 23.9 kg/m2 Estimated body mass index is 23.9 kg/(m^2) as calculated from the following:    Height as of 2/28/17: 6' 1\" (1.854 m).    Weight as of this encounter: 181 lb 2 oz (82.2 kg).  Medication Reconciliation: complete   Luz Elena Gomez CMA      "

## 2017-07-12 NOTE — PROGRESS NOTES
SUBJECTIVE:                                                    José Manuel Jerngian is a 61 year old male who presents to clinic today for the following health issues:       For the past couple weeks he has only been getting about an hour of sleep at night. He states he is also having some discomfort on the right side of his body and thinks that may be playing a role in his sleeping problem    none    Problem list and histories reviewed & adjusted, as indicated.  Additional history: as documented         Reviewed and updated as needed this visit by clinical staff       Reviewed and updated as needed this visit by Provider          never slept well, but this is unusual    Only about an hour of sleep     No napping    Bad for a couple weeks    Discomfort right side for a few months.  Getting worse. Dull ache.    No change in diet/ schedule    No exertional pain    Eating and drinking okay.    Bowels and bladder fine.    The ache becomes more predominant at night.     Sugars 160s    A little wt loss    [facility]    Sometimes gets real low on the sugars, 57ish.      Catches a little when breathing     Stopped caffeine except for one cup in am    Tried various non med treatments for insomnia    Patient feels a little depressed but he thinks the sleep problem came 1st     Job change and reduced income in the last year        Physical Exam   Constitutional: He is oriented to person, place, and time and well-developed, well-nourished, and in no distress. No distress.   HENT:   Head: Normocephalic and atraumatic.   Eyes: Conjunctivae are normal.   Neck: Carotid bruit is not present.   Cardiovascular: Normal rate, regular rhythm, normal heart sounds and intact distal pulses.  Exam reveals no gallop and no friction rub.    No murmur heard.  Pulmonary/Chest: Effort normal and breath sounds normal. No respiratory distress. He has no wheezes. He has no rales.   Abdominal: Soft. Bowel sounds are normal. He  exhibits no distension and no mass. There is no tenderness. There is no rebound and no guarding.   Musculoskeletal: He exhibits no edema.   Neurological: He is alert and oriented to person, place, and time.   Skin: He is not diaphoretic.   Psychiatric: Mood and affect normal.   no back, spine, costovertebral angle tenderness   No rib or flank tenderness, nor over arms or legs  Patient points to right flank and down to right leg when asked where pain is        cxr normal    Labs pending    ASSESSMENT / PLAN:  (G47.00) Insomnia, unspecified type  (primary encounter diagnosis)  Comment: may be multifactorial  Plan: patient could try over the counter melatonin   Follow up in clinic if symptoms not improving significantly     (R53.83) Fatigue, unspecified type  Comment: check labs   Plan: TSH with free T4 reflex, CBC with platelets         differential, Comprehensive metabolic panel             (E11.9,  Z79.4) Type 2 diabetes mellitus without complication, with long-term current use of insulin (H)  Comment: patient gets his diabetes care elsewhere  Plan: Hemoglobin A1c             (R63.4) Loss of weight  Comment: monitor  Plan: XR Chest 2 Views             (Z11.9) Screening examination for infectious disease  Comment: check   Plan: Hepatitis C antibody             (E78.5) Hyperlipidemia LDL goal <100  Comment: check labs  Plan: Lipid panel reflex to direct LDL               I reviewed the patient's medications, allergies, medical history, family history, and social history.    Tim Soliman MD

## 2017-07-12 NOTE — PATIENT INSTRUCTIONS
Try melatonin at bedtime, start about 3 mg     Can work up on that    Max is 10 mg    We will send you other results    Follow up in clinic if symptoms not improving in next weeks

## 2017-07-15 NOTE — PROGRESS NOTES
Your cholesterol is moderately high.  Consider starting medications for this.  If interested, let me know.    The hemoglobin a1c we checked is very high.  Advise follow up with your diabetes provider soon.    Other labs are okay.    Follow up in clinic if symptoms not resolving.    Tim Soliman MD

## 2017-07-24 ENCOUNTER — TELEPHONE (OUTPATIENT)
Dept: FAMILY MEDICINE | Facility: CLINIC | Age: 62
End: 2017-07-24

## 2017-07-24 NOTE — TELEPHONE ENCOUNTER
Panel Management Review      Patient has the following on his problem list:     Diabetes    ASA: Passed    Last A1C  Lab Results   Component Value Date    A1C 10.9 07/12/2017    A1C 12.5 03/14/2016    A1C 10.8 02/27/2015     A1C tested: FAILED    Last LDL:    Lab Results   Component Value Date    CHOL 231 07/12/2017     Lab Results   Component Value Date    HDL 85 07/12/2017     Lab Results   Component Value Date     07/12/2017     Lab Results   Component Value Date    TRIG 85 07/12/2017     No results found for: CHOLHDLRATIO  Lab Results   Component Value Date    NHDL 146 07/12/2017       Is the patient on a Statin? NO             Is the patient on Aspirin? YES    Medications     Salicylates    aspirin 81 MG tablet          Last three blood pressure readings:  BP Readings from Last 3 Encounters:   07/12/17 116/70   06/26/17 138/83   02/28/17 124/75       Date of last diabetes office visit: 06/2017     Tobacco History:     History   Smoking Status     Never Smoker   Smokeless Tobacco     Not on file             Composite cancer screening  Chart review shows that this patient is due/due soon for the following Colonoscopy  Summary:    Patient is due/failing the following:   COLONOSCOPY    Action needed:   Patient needs referral/order: colon order done    Type of outreach:    patient received colon referral at the end of June    Questions for provider review:    None                                                                                                                                    Luz Elena Gomez CMA       Chart routed to chart closed .

## 2017-08-21 ENCOUNTER — TELEPHONE (OUTPATIENT)
Dept: FAMILY MEDICINE | Facility: CLINIC | Age: 62
End: 2017-08-21

## 2017-08-21 ENCOUNTER — TRANSFERRED RECORDS (OUTPATIENT)
Dept: HEALTH INFORMATION MANAGEMENT | Facility: CLINIC | Age: 62
End: 2017-08-21

## 2017-08-21 NOTE — TELEPHONE ENCOUNTER
Reason for Call:  Form, our goal is to have forms completed with 72 hours, however, some forms may require a visit or additional information.    Type of letter, form or note:  Work    Who is the form from?: Patient    Where did the form come from: Patient or family brought in       What clinic location was the form placed at?: [facility] ()    Where the form was placed: 's Box    What number is listed as a contact on the form?: [phone]       Additional comments: patient needs a note to go back to work.    Call taken on 8/21/2017 at 12:00 PM by Ankita Alarcon

## 2017-08-22 NOTE — TELEPHONE ENCOUNTER
Date forms received: 8-22-17  Form completed as much as possible by Dulce Trevino.  Forms placed: provider desk Date placed: 8-22-17  Dulce Trevino

## 2017-08-22 NOTE — TELEPHONE ENCOUNTER
Patient calling to check on status on form.  Informed that is is not done yet.  Please call to inform when this has been done.

## 2017-08-23 NOTE — TELEPHONE ENCOUNTER
Patient calling to check on status of forms. Informed patient that forms have been given to provider to complete.

## 2017-08-23 NOTE — TELEPHONE ENCOUNTER
Date forms retrieved from team basket: 17  Were forms completed/signed:  yes  [facility]: [phone].  Did patient request to be contacted when forms were complete: yes   Patient was contacted via: phone  Date: 17  Date sent to [email or web address]  Dulce Trevino

## 2017-10-18 ENCOUNTER — SURGERY (OUTPATIENT)
Age: 62
End: 2017-10-18

## 2017-10-18 ENCOUNTER — HOSPITAL ENCOUNTER (OUTPATIENT)
Facility: AMBULATORY SURGERY CENTER | Age: 62
Discharge: HOME | End: 2017-10-18
Attending: INTERNAL MEDICINE | Admitting: INTERNAL MEDICINE
Payer: COMMERCIAL

## 2017-10-18 VITALS
RESPIRATION RATE: 18 BRPM | OXYGEN SATURATION: 99 % | SYSTOLIC BLOOD PRESSURE: 132 MMHG | DIASTOLIC BLOOD PRESSURE: 78 MMHG | WEIGHT: 175 LBS | TEMPERATURE: 97.2 F | HEIGHT: 74 IN | BODY MASS INDEX: 22.46 KG/M2

## 2017-10-18 LAB
COLONOSCOPY: NORMAL
GLUCOSE BLDC GLUCOMTR-MCNC: 222 MG/DL (ref 70–99)

## 2017-10-18 PROCEDURE — G8907 PT DOC NO EVENTS ON DISCHARG: HCPCS

## 2017-10-18 PROCEDURE — 88305 TISSUE EXAM BY PATHOLOGIST: CPT | Performed by: INTERNAL MEDICINE

## 2017-10-18 PROCEDURE — 45385 COLONOSCOPY W/LESION REMOVAL: CPT

## 2017-10-18 PROCEDURE — 45380 COLONOSCOPY AND BIOPSY: CPT | Mod: XS

## 2017-10-18 PROCEDURE — G8918 PT W/O PREOP ORDER IV AB PRO: HCPCS

## 2017-10-18 RX ORDER — LIDOCAINE 40 MG/G
CREAM TOPICAL
Status: DISCONTINUED | OUTPATIENT
Start: 2017-10-18 | End: 2017-10-19 | Stop reason: HOSPADM

## 2017-10-18 RX ORDER — ONDANSETRON 2 MG/ML
4 INJECTION INTRAMUSCULAR; INTRAVENOUS
Status: DISCONTINUED | OUTPATIENT
Start: 2017-10-18 | End: 2017-10-19 | Stop reason: HOSPADM

## 2017-10-18 RX ORDER — FENTANYL CITRATE 50 UG/ML
INJECTION, SOLUTION INTRAMUSCULAR; INTRAVENOUS PRN
Status: DISCONTINUED | OUTPATIENT
Start: 2017-10-18 | End: 2017-10-18 | Stop reason: HOSPADM

## 2017-10-18 RX ADMIN — FENTANYL CITRATE 100 MCG: 50 INJECTION, SOLUTION INTRAMUSCULAR; INTRAVENOUS at 09:38

## 2017-10-19 LAB — COPATH REPORT: NORMAL

## 2018-04-16 ENCOUNTER — OFFICE VISIT (OUTPATIENT)
Dept: FAMILY MEDICINE | Facility: CLINIC | Age: 63
End: 2018-04-16
Payer: COMMERCIAL

## 2018-04-16 VITALS
HEIGHT: 74 IN | TEMPERATURE: 97.1 F | BODY MASS INDEX: 23.61 KG/M2 | HEART RATE: 84 BPM | WEIGHT: 184 LBS | OXYGEN SATURATION: 97 % | DIASTOLIC BLOOD PRESSURE: 72 MMHG | SYSTOLIC BLOOD PRESSURE: 127 MMHG

## 2018-04-16 DIAGNOSIS — H10.32 ACUTE CONJUNCTIVITIS OF LEFT EYE, UNSPECIFIED ACUTE CONJUNCTIVITIS TYPE: Primary | ICD-10-CM

## 2018-04-16 DIAGNOSIS — J20.9 ACUTE BRONCHITIS, UNSPECIFIED ORGANISM: ICD-10-CM

## 2018-04-16 PROCEDURE — 99213 OFFICE O/P EST LOW 20 MIN: CPT | Performed by: FAMILY MEDICINE

## 2018-04-16 RX ORDER — AZITHROMYCIN 250 MG/1
TABLET, FILM COATED ORAL
Qty: 6 TABLET | Refills: 1 | Status: SHIPPED | OUTPATIENT
Start: 2018-04-16 | End: 2019-01-08

## 2018-04-16 RX ORDER — POLYMYXIN B SULFATE AND TRIMETHOPRIM 1; 10000 MG/ML; [USP'U]/ML
1 SOLUTION OPHTHALMIC 4 TIMES DAILY
Qty: 2 ML | Refills: 0 | Status: SHIPPED | OUTPATIENT
Start: 2018-04-16 | End: 2018-04-23

## 2018-04-16 ASSESSMENT — PAIN SCALES - GENERAL: PAINLEVEL: NO PAIN (0)

## 2018-04-16 NOTE — MR AVS SNAPSHOT
"              After Visit Summary   4/16/2018    José Manuel Jernigan    MRN: [id number]           Patient Information     Date Of Birth          1955        Visit Information        Provider Department      4/16/2018 3:20 PM Tim Soliman MD [facility]        Today's Diagnoses     Acute conjunctivitis of left eye, unspecified acute conjunctivitis type    -  1    Acute bronchitis, unspecified organism          Care Instructions    Take the antibiotic eye drops both eyes     Monitor cough.  If worsens or does not improve, then fill prescription for oral antibiotic    Stay well hydrated    Follow up as needed based on symptoms    See eye doctor if eye symptoms not resolving    Be seen promptly if symptoms acutely worsen          Follow-ups after your visit        Who to contact     If you have questions or need follow up information about today's clinic visit or your schedule please contact [facility] directly at [phone].  Normal or non-critical lab and imaging results will be communicated to you by Yashihart, letter or phone within 4 business days after the clinic has received the results. If you do not hear from us within 7 days, please contact the clinic through MyChart or phone. If you have a critical or abnormal lab result, we will notify you by phone as soon as possible.  Submit refill requests through [employer] or call your pharmacy and they will forward the refill request to us. Please allow 3 business days for your refill to be completed.          Additional Information About Your Visit        MyChart Information     [employer] lets you send messages to your doctor, view your test results, renew your prescriptions, schedule appointments and more. To sign up, go to [email or web address] . Click on \"Log in\" on the left side of the screen, which will take you to the Welcome page. Then click on \"Sign up Now\" on the right side of the page.     You will be " "asked to enter the access code listed below, as well as some personal information. Please follow the directions to create your username and password.     Your access code is: [email or web address]  Expires: 7/15/2018  4:03 PM     Your access code will  in 90 days. If you need help or a new code, please call your [facility] clinic or [phone].        Care EveryWhere ID     This is your Care EveryWhere ID. This could be used by other organizations to access your [facility] medical records  [id number]        Your Vitals Were     Pulse Temperature Height Pulse Oximetry BMI (Body Mass Index)       84 97.1  F (36.2  C) (Oral) 6' 2\" (1.88 m) 97% 23.62 kg/m2        Blood Pressure from Last 3 Encounters:   18 127/72   10/18/17 132/78   17 116/70    Weight from Last 3 Encounters:   18 184 lb (83.5 kg)   10/13/17 175 lb (79.4 kg)   17 181 lb 2 oz (82.2 kg)              Today, you had the following     No orders found for display         Today's Medication Changes          These changes are accurate as of 18  4:03 PM.  If you have any questions, ask your nurse or doctor.               Start taking these medicines.        Dose/Directions    azithromycin 250 MG tablet   Commonly known as:  ZITHROMAX   Used for:  Acute bronchitis, unspecified organism   Started by:  Tim Soliman MD        2 po daily x one day then 1 po daily x 4 days   Quantity:  6 tablet   Refills:  1       trimethoprim-polymyxin b ophthalmic solution   Commonly known as:  POLYTRIM   Used for:  Acute conjunctivitis of left eye, unspecified acute conjunctivitis type   Started by:  Tim Soliman MD        Dose:  1 drop   Place 1 drop into both eyes 4 times daily for 7 days   Quantity:  2 mL   Refills:  0            Where to get your medicines      These medications were sent to [facility] [place], [place] - [address]  [address], [facility] [address]     Phone:  [phone] "     trimethoprim-polymyxin b ophthalmic solution         Some of these will need a paper prescription and others can be bought over the counter.  Ask your nurse if you have questions.     Bring a paper prescription for each of these medications     azithromycin 250 MG tablet                Primary Care Provider Office Phone # Fax #    Tim Soliman [phone]       [address] [facility] [address]        Equal Access to Services     [facility] CenterDASHAWN : Hadii aad ku hadasho Soomaali, waaxda luqadaha, qaybta kaalmada adeegyada, waxay idiin hayaan adeeg khbrookesh la'aan . So [facility] [phone].    ATENCIÓN: Si habla español, tiene a akins disposición servicios gratuitos de asistencia lingüística. Llame al [phone].    We comply with applicable federal civil rights laws and [place] laws. We do not discriminate on the basis of race, color, national origin, age, disability, sex, sexual orientation, or gender identity.            Thank you!     Thank you for choosing [facility]  for your care. Our goal is always to provide you with excellent care. Hearing back from our patients is one way we can continue to improve our services. Please take a few minutes to complete the written survey that you may receive in the mail after your visit with us. Thank you!             Your Updated Medication List - Protect others around you: Learn how to safely use, store and throw away your medicines at www.disposemymeds.org.          This list is accurate as of 4/16/18  4:03 PM.  Always use your most recent med list.                   Brand Name Dispense Instructions for use Diagnosis    aspirin 81 MG tablet     90 tablet    Take 1 tablet (81 mg) by mouth daily    Health care maintenance       azithromycin 250 MG tablet    ZITHROMAX    6 tablet    2 po daily x one day then 1 po daily x 4 days    Acute bronchitis, unspecified organism       INSULIN ASPART SC      26 units at bedtime  unsure of which insulin though        MULTIVITAMIN PO      Take by mouth daily        ranitidine 150 MG tablet    ZANTAC    60 tablet    Take 1 tablet (150 mg) by mouth 2 times daily    Abdominal pain, generalized       trimethoprim-polymyxin b ophthalmic solution    POLYTRIM    2 mL    Place 1 drop into both eyes 4 times daily for 7 days    Acute conjunctivitis of left eye, unspecified acute conjunctivitis type

## 2018-04-16 NOTE — PATIENT INSTRUCTIONS
Take the antibiotic eye drops both eyes     Monitor cough.  If worsens or does not improve, then fill prescription for oral antibiotic    Stay well hydrated    Follow up as needed based on symptoms    See eye doctor if eye symptoms not resolving    Be seen promptly if symptoms acutely worsen

## 2018-04-16 NOTE — PROGRESS NOTES
SUBJECTIVE:   José Manuel Jernigan is a 62 year old male who presents to clinic today for the following health issues:       Eye(s) Problem  Onset: Yesterday morning    Description:   Location: left  Pain: YES  Redness: YES    Accompanying Signs & Symptoms:  Discharge/mattering: YES  Swelling: YES  Visual changes: no   Fever: no   Nasal Congestion: YES  Bothered by bright lights: no     History:   Trauma: no   Foreign body exposure: no     Precipitating factors:   Wearing contacts: no     Alleviating factors:  Improved by: none    Therapies Tried and outcome: none    Cough since last Thursday    Problem list and histories reviewed & adjusted, as indicated.  Additional history: as documented         Reviewed and updated as needed this visit by clinical staff       Reviewed and updated as needed this visit by Provider          yellow crust, light    No trauma to eye     No contacts    Just glasses    No chemicals/ fumes    No fever/ chills    Full physical not done     Mentation and affect are fine    No tremor of speech or extremity    Heart and lungs okay here     No resp distress    Only rare cough    Tympanic membranes and canals okay    Nasal and oral mucosa okay    Slight redness of left upper eyelid and a bit below left eye    Conj on left eye red, normal on right    No discharge presently    No sinus/ submandib tenderness    eom intact, perrl    Fundi only partially seen, no obvious problems    ASSESSMENT / PLAN:  (H10.32) Acute conjunctivitis of left eye, unspecified acute conjunctivitis type  (primary encounter diagnosis)  Comment: will treat.    Plan: trimethoprim-polymyxin b (POLYTRIM) ophthalmic         solution        If symptoms worsen, to eye doctor promptly.      (J20.9) Acute bronchitis, unspecified organism  Comment: prescription to hold.  Fill if symptoms worsen/ don't resolve  Plan: azithromycin (ZITHROMAX) 250 MG tablet        Follow up prn symptoms       I reviewed the patient's medications,  allergies, medical history, family history, and social history.    Tim Soliman MD

## 2018-08-15 ENCOUNTER — TRANSFERRED RECORDS (OUTPATIENT)
Dept: HEALTH INFORMATION MANAGEMENT | Facility: CLINIC | Age: 63
End: 2018-08-15

## 2018-08-16 ENCOUNTER — TRANSFERRED RECORDS (OUTPATIENT)
Dept: HEALTH INFORMATION MANAGEMENT | Facility: CLINIC | Age: 63
End: 2018-08-16

## 2018-08-16 LAB — EJECTION FRACTION: 63

## 2018-08-23 ENCOUNTER — RADIANT APPOINTMENT (OUTPATIENT)
Dept: GENERAL RADIOLOGY | Facility: CLINIC | Age: 63
End: 2018-08-23
Attending: FAMILY MEDICINE
Payer: COMMERCIAL

## 2018-08-23 ENCOUNTER — OFFICE VISIT (OUTPATIENT)
Dept: FAMILY MEDICINE | Facility: CLINIC | Age: 63
End: 2018-08-23
Payer: COMMERCIAL

## 2018-08-23 VITALS
WEIGHT: 177.25 LBS | SYSTOLIC BLOOD PRESSURE: 116 MMHG | DIASTOLIC BLOOD PRESSURE: 71 MMHG | TEMPERATURE: 97 F | BODY MASS INDEX: 22.76 KG/M2 | OXYGEN SATURATION: 99 % | HEART RATE: 80 BPM

## 2018-08-23 DIAGNOSIS — S22.42XA CLOSED FRACTURE OF MULTIPLE RIBS OF LEFT SIDE, INITIAL ENCOUNTER: ICD-10-CM

## 2018-08-23 DIAGNOSIS — Z79.4 TYPE 2 DIABETES MELLITUS WITHOUT COMPLICATION, WITH LONG-TERM CURRENT USE OF INSULIN (H): ICD-10-CM

## 2018-08-23 DIAGNOSIS — S27.0XXA TRAUMATIC PNEUMOTHORAX, INITIAL ENCOUNTER: Primary | ICD-10-CM

## 2018-08-23 DIAGNOSIS — S22.20XA CLOSED FRACTURE OF STERNUM, UNSPECIFIED PORTION OF STERNUM, INITIAL ENCOUNTER: ICD-10-CM

## 2018-08-23 DIAGNOSIS — E11.9 TYPE 2 DIABETES MELLITUS WITHOUT COMPLICATION, WITH LONG-TERM CURRENT USE OF INSULIN (H): ICD-10-CM

## 2018-08-23 DIAGNOSIS — S27.0XXA TRAUMATIC PNEUMOTHORAX, INITIAL ENCOUNTER: ICD-10-CM

## 2018-08-23 PROCEDURE — 71046 X-RAY EXAM CHEST 2 VIEWS: CPT | Mod: FY

## 2018-08-23 PROCEDURE — 99214 OFFICE O/P EST MOD 30 MIN: CPT | Performed by: FAMILY MEDICINE

## 2018-08-23 ASSESSMENT — PAIN SCALES - GENERAL: PAINLEVEL: MODERATE PAIN (4)

## 2018-08-23 NOTE — MR AVS SNAPSHOT
"              After Visit Summary   8/23/2018    José Manuel Jernigan    MRN: [id number]           Patient Information     Date Of Birth          1955        Visit Information        Provider Department      8/23/2018 8:40 AM Tim Soliman MD [facility]        Today's Diagnoses     Traumatic pneumothorax, initial encounter    -  1      Care Instructions    Gradually return to normal activities as able    Pneumothorax has resolved    Stay off narcotics    Use the bowel med as needed    Go back to endocrinology for diabetes              Follow-ups after your visit        Who to contact     If you have questions or need follow up information about today's clinic visit or your schedule please contact [facility] directly at [phone].  Normal or non-critical lab and imaging results will be communicated to you by MyChart, letter or phone within 4 business days after the clinic has received the results. If you do not hear from us within 7 days, please contact the clinic through MyChart or phone. If you have a critical or abnormal lab result, we will notify you by phone as soon as possible.  Submit refill requests through [email or web address] or call your pharmacy and they will forward the refill request to us. Please allow 3 business days for your refill to be completed.          Additional Information About Your Visit        MyChart Information     [email or web address] lets you send messages to your doctor, view your test results, renew your prescriptions, schedule appointments and more. To sign up, go to [email or web address] . Click on \"Log in\" on the left side of the screen, which will take you to the Welcome page. Then click on \"Sign up Now\" on the right side of the page.     You will be asked to enter the access code listed below, as well as some personal information. Please follow the directions to create your username and password.     Your access code is: [email or web address]  Expires: " 2018 10:11 AM     Your access code will  in 90 days. If you need help or a new code, please call your [facility] clinic or [phone].        Care EveryWhere ID     This is your Care EveryWhere ID. This could be used by other organizations to access your [facility] medical records  [id number]        Your Vitals Were     Pulse Temperature Pulse Oximetry BMI (Body Mass Index)          80 97  F (36.1  C) (Oral) 99% 22.76 kg/m2         Blood Pressure from Last 3 Encounters:   18 116/71   18 127/72   10/18/17 132/78    Weight from Last 3 Encounters:   18 177 lb 4 oz (80.4 kg)   18 184 lb (83.5 kg)   10/13/17 175 lb (79.4 kg)               Primary Care Provider Office Phone # Fax #    Tim Soliman [phone]       [address] [facility] [address]        Equal Access to Services     [facility] HospitalDASHAWN : Hadii aad ku hadasho Soomaali, waaxda luqadaha, qaybta kaalmada adeegyada, waxay idiin hayaan corneliuseg kharash laabram . So [facility] [phone].    ATENCIÓN: Si habla español, tiene a akins disposición servicios gratuitos de asistencia lingüística. LlAshtabula General Hospital [phone].    We comply with applicable federal civil rights laws and [place] laws. We do not discriminate on the basis of race, color, national origin, age, disability, sex, sexual orientation, or gender identity.            Thank you!     Thank you for choosing [facility]  for your care. Our goal is always to provide you with excellent care. Hearing back from our patients is one way we can continue to improve our services. Please take a few minutes to complete the written survey that you may receive in the mail after your visit with us. Thank you!             Your Updated Medication List - Protect others around you: Learn how to safely use, store and throw away your medicines at www.disposemymeds.org.          This list is accurate as of 18 10:11 AM.  Always use your most recent  med list.                   Brand Name Dispense Instructions for use Diagnosis    aspirin 81 MG tablet     90 tablet    Take 1 tablet (81 mg) by mouth daily    Health care maintenance       azithromycin 250 MG tablet    ZITHROMAX    6 tablet    2 po daily x one day then 1 po daily x 4 days    Acute bronchitis, unspecified organism       INSULIN ASPART SC      26 units at bedtime unsure of which insulin though        MULTIVITAMIN PO      Take by mouth daily        ranitidine 150 MG tablet    ZANTAC    60 tablet    Take 1 tablet (150 mg) by mouth 2 times daily    Abdominal pain, generalized

## 2018-08-23 NOTE — PATIENT INSTRUCTIONS
Gradually return to normal activities as able    Pneumothorax has resolved    Stay off narcotics    Use the bowel med as needed    Go back to endocrinology for diabetes

## 2018-08-23 NOTE — PROGRESS NOTES
SUBJECTIVE:   José Manuel Jernigan is a 62 year old male who presents to clinic today for the following health issues:       ED/UC Followup:    Facility:  [facility]  Date of visit: 08/14/2018  Reason for visit: fall  Current Status: stable         none    Problem list and histories reviewed & adjusted, as indicated.  Additional history: as documented         Reviewed and updated as needed this visit by clinical staff  Allergies  Meds  Problems       Reviewed and updated as needed this visit by Provider            Hemoglobin a1c in hospital 13.2    Sugars high recently per patient but rarely gets quite low     Had been drinking etoh night before fall    Reviewed emergency room note in great detail    Lots of pressure and tension in stomach since incident    Not sleeping well    Eating and drinking okay    Bowels not often, only 2 in last week    Usually every other day     Urinating fine    Has senna-s to use as needed    Used the tylenol    Stopped oxycodone    Stopped muscle relaxer     Not much effect with lidocaine patch        Physical Exam   Constitutional: He is oriented to person, place, and time and well-developed, well-nourished, and in no distress. No distress.   HENT:   Head: Normocephalic and atraumatic.   Eyes: Conjunctivae are normal.   Neck: Carotid bruit is not present.   Cardiovascular: Normal rate, regular rhythm, normal heart sounds and intact distal pulses.  Exam reveals no gallop and no friction rub.    No murmur heard.  Pulmonary/Chest: Effort normal and breath sounds normal. No respiratory distress. He has no wheezes. He has no rales.   Musculoskeletal: He exhibits no edema.   Neurological: He is alert and oriented to person, place, and time.   Skin: He is not diaphoretic.   Psychiatric: Mood and affect normal.   patient tender left lateral inferior ribs.  Some bruising present at this area and below  Some soreness mid and upper abdomen and at left costal margin      ASSESSMENT /  PLAN:  (S27.0XXA) Traumatic pneumothorax, initial encounter  (primary encounter diagnosis)  Comment: cxr here, nomral.  Small ptx has resolved.    Plan: XR Chest 2 Views        Follow up prn     (S22.42XA) Closed fracture of multiple ribs of left side, initial encounter  Comment: still symptoms, but encourage him to stay off narcotics.   Plan: follow up prn symptoms.  Gradually return to normal activities as tolerated.     (E11.9,  Z79.4) Type 2 diabetes mellitus without complication, with long-term current use of insulin (H)  Comment: very high hemoglobin a1c in hospital.   Plan: patient advised to schedule follow up with his endocrinology provider     (S22.20XA) Closed fracture of sternum, unspecified portion of sternum, initial encounter  Comment: only very mildly tender right side of sternum.  This should resolve.   Plan: follow up prn       I reviewed the patient's medications, allergies, medical history, family history, and social history.    Tim Soliman MD

## 2019-01-08 ENCOUNTER — OFFICE VISIT (OUTPATIENT)
Dept: FAMILY MEDICINE | Facility: CLINIC | Age: 64
End: 2019-01-08
Payer: COMMERCIAL

## 2019-01-08 VITALS
HEIGHT: 73 IN | SYSTOLIC BLOOD PRESSURE: 119 MMHG | BODY MASS INDEX: 23.36 KG/M2 | DIASTOLIC BLOOD PRESSURE: 73 MMHG | TEMPERATURE: 97.5 F | HEART RATE: 84 BPM | WEIGHT: 176.25 LBS

## 2019-01-08 DIAGNOSIS — Z12.5 SCREENING FOR PROSTATE CANCER: ICD-10-CM

## 2019-01-08 DIAGNOSIS — E11.42 TYPE 2 DIABETES MELLITUS WITH DIABETIC POLYNEUROPATHY, WITH LONG-TERM CURRENT USE OF INSULIN (H): ICD-10-CM

## 2019-01-08 DIAGNOSIS — Z00.00 ENCOUNTER FOR PREVENTATIVE ADULT HEALTH CARE EXAMINATION: Primary | ICD-10-CM

## 2019-01-08 DIAGNOSIS — B36.0 TINEA VERSICOLOR: ICD-10-CM

## 2019-01-08 DIAGNOSIS — E78.5 HYPERLIPIDEMIA LDL GOAL <100: ICD-10-CM

## 2019-01-08 DIAGNOSIS — R09.82 POST-NASAL DRIP: ICD-10-CM

## 2019-01-08 DIAGNOSIS — R53.83 FATIGUE, UNSPECIFIED TYPE: ICD-10-CM

## 2019-01-08 DIAGNOSIS — Z79.4 TYPE 2 DIABETES MELLITUS WITH DIABETIC POLYNEUROPATHY, WITH LONG-TERM CURRENT USE OF INSULIN (H): ICD-10-CM

## 2019-01-08 DIAGNOSIS — J34.89 NASAL OBSTRUCTION: ICD-10-CM

## 2019-01-08 LAB
ALBUMIN SERPL-MCNC: 4 G/DL (ref 3.4–5)
ALP SERPL-CCNC: 77 U/L (ref 40–150)
ALT SERPL W P-5'-P-CCNC: 19 U/L (ref 0–70)
ANION GAP SERPL CALCULATED.3IONS-SCNC: 9 MMOL/L (ref 3–14)
AST SERPL W P-5'-P-CCNC: 9 U/L (ref 0–45)
BASOPHILS # BLD AUTO: 0 10E9/L (ref 0–0.2)
BASOPHILS NFR BLD AUTO: 0.8 %
BILIRUB SERPL-MCNC: 0.3 MG/DL (ref 0.2–1.3)
BUN SERPL-MCNC: 18 MG/DL (ref 7–30)
CALCIUM SERPL-MCNC: 9.1 MG/DL (ref 8.5–10.1)
CHLORIDE SERPL-SCNC: 101 MMOL/L (ref 94–109)
CHOLEST SERPL-MCNC: 212 MG/DL
CO2 SERPL-SCNC: 26 MMOL/L (ref 20–32)
CREAT SERPL-MCNC: 0.66 MG/DL (ref 0.66–1.25)
CREAT UR-MCNC: 63 MG/DL
DIFFERENTIAL METHOD BLD: NORMAL
EOSINOPHIL # BLD AUTO: 0.1 10E9/L (ref 0–0.7)
EOSINOPHIL NFR BLD AUTO: 2.3 %
ERYTHROCYTE [DISTWIDTH] IN BLOOD BY AUTOMATED COUNT: 12.8 % (ref 10–15)
GFR SERPL CREATININE-BSD FRML MDRD: >90 ML/MIN/{1.73_M2}
GLUCOSE SERPL-MCNC: 322 MG/DL (ref 70–99)
HBA1C MFR BLD: 13.4 % (ref 0–5.6)
HCT VFR BLD AUTO: 44.5 % (ref 40–53)
HDLC SERPL-MCNC: 79 MG/DL
HGB BLD-MCNC: 15 G/DL (ref 13.3–17.7)
LDLC SERPL CALC-MCNC: 114 MG/DL
LYMPHOCYTES # BLD AUTO: 1.1 10E9/L (ref 0.8–5.3)
LYMPHOCYTES NFR BLD AUTO: 28.6 %
MCH RBC QN AUTO: 29.9 PG (ref 26.5–33)
MCHC RBC AUTO-ENTMCNC: 33.7 G/DL (ref 31.5–36.5)
MCV RBC AUTO: 89 FL (ref 78–100)
MICROALBUMIN UR-MCNC: 10 MG/L
MICROALBUMIN/CREAT UR: 15.65 MG/G CR (ref 0–17)
MONOCYTES # BLD AUTO: 0.6 10E9/L (ref 0–1.3)
MONOCYTES NFR BLD AUTO: 13.8 %
NEUTROPHILS # BLD AUTO: 2.2 10E9/L (ref 1.6–8.3)
NEUTROPHILS NFR BLD AUTO: 54.5 %
NONHDLC SERPL-MCNC: 133 MG/DL
PLATELET # BLD AUTO: 252 10E9/L (ref 150–450)
POTASSIUM SERPL-SCNC: 4.5 MMOL/L (ref 3.4–5.3)
PROT SERPL-MCNC: 7.1 G/DL (ref 6.8–8.8)
PSA SERPL-ACNC: 0.77 UG/L (ref 0–4)
RBC # BLD AUTO: 5.01 10E12/L (ref 4.4–5.9)
SODIUM SERPL-SCNC: 136 MMOL/L (ref 133–144)
TRIGL SERPL-MCNC: 94 MG/DL
TSH SERPL DL<=0.005 MIU/L-ACNC: 0.96 MU/L (ref 0.4–4)
WBC # BLD AUTO: 4 10E9/L (ref 4–11)

## 2019-01-08 PROCEDURE — 82306 VITAMIN D 25 HYDROXY: CPT | Performed by: FAMILY MEDICINE

## 2019-01-08 PROCEDURE — 82043 UR ALBUMIN QUANTITATIVE: CPT | Performed by: FAMILY MEDICINE

## 2019-01-08 PROCEDURE — 99213 OFFICE O/P EST LOW 20 MIN: CPT | Mod: 25 | Performed by: FAMILY MEDICINE

## 2019-01-08 PROCEDURE — 83036 HEMOGLOBIN GLYCOSYLATED A1C: CPT | Performed by: FAMILY MEDICINE

## 2019-01-08 PROCEDURE — 80061 LIPID PANEL: CPT | Performed by: FAMILY MEDICINE

## 2019-01-08 PROCEDURE — 36415 COLL VENOUS BLD VENIPUNCTURE: CPT | Performed by: FAMILY MEDICINE

## 2019-01-08 PROCEDURE — 84403 ASSAY OF TOTAL TESTOSTERONE: CPT | Performed by: FAMILY MEDICINE

## 2019-01-08 PROCEDURE — G0103 PSA SCREENING: HCPCS | Performed by: FAMILY MEDICINE

## 2019-01-08 PROCEDURE — 80053 COMPREHEN METABOLIC PANEL: CPT | Performed by: FAMILY MEDICINE

## 2019-01-08 PROCEDURE — 99207 C FOOT EXAM  NO CHARGE: CPT | Mod: 25 | Performed by: FAMILY MEDICINE

## 2019-01-08 PROCEDURE — 85025 COMPLETE CBC W/AUTO DIFF WBC: CPT | Performed by: FAMILY MEDICINE

## 2019-01-08 PROCEDURE — 84443 ASSAY THYROID STIM HORMONE: CPT | Performed by: FAMILY MEDICINE

## 2019-01-08 PROCEDURE — 99396 PREV VISIT EST AGE 40-64: CPT | Performed by: FAMILY MEDICINE

## 2019-01-08 RX ORDER — KETOCONAZOLE 20 MG/G
CREAM TOPICAL 2 TIMES DAILY PRN
Qty: 60 G | Refills: 3 | Status: SHIPPED | OUTPATIENT
Start: 2019-01-08 | End: 2019-02-07

## 2019-01-08 RX ORDER — FLUTICASONE PROPIONATE 50 MCG
1 SPRAY, SUSPENSION (ML) NASAL DAILY
Qty: 1 BOTTLE | Refills: 11 | Status: SHIPPED | OUTPATIENT
Start: 2019-01-08 | End: 2020-01-08

## 2019-01-08 ASSESSMENT — ENCOUNTER SYMPTOMS
DIARRHEA: 0
NERVOUS/ANXIOUS: 0
FEVER: 0
EYE PAIN: 0
CONSTIPATION: 0
CHILLS: 0
DIZZINESS: 0
HEMATURIA: 0
ABDOMINAL PAIN: 0
COUGH: 0
HEMATOCHEZIA: 0

## 2019-01-08 ASSESSMENT — PAIN SCALES - GENERAL: PAINLEVEL: NO PAIN (0)

## 2019-01-08 ASSESSMENT — MIFFLIN-ST. JEOR: SCORE: 1651.95

## 2019-01-08 NOTE — PATIENT INSTRUCTIONS
We will send you lab results    Try over the counter fluticasone for a few weeks    If not improved, then see ENT doctor

## 2019-01-08 NOTE — PROGRESS NOTES
SUBJECTIVE:   CC: José Manuel Jernigan is an 63 year old male who presents for preventative health visit.     Physical   Annual:     Getting at least 3 servings of Calcium per day:  Yes    Bi-annual eye exam:  NO    Dental care twice a year:  NO    Sleep apnea or symptoms of sleep apnea:  None    Diet:  Diabetic    Frequency of exercise:  None    Taking medications regularly:  Yes    Medication side effects:  Not applicable    Additional concerns today:  No    PHQ-2 Total Score: 1          none    Today's PHQ-2 Score:   PHQ-2 ( 1999 [employer]) 1/8/2019   Q1: Little interest or pleasure in doing things 1   Q2: Feeling down, depressed or hopeless 0   PHQ-2 Score 1   Q1: Little interest or pleasure in doing things Several days   Q2: Feeling down, depressed or hopeless Not at all   PHQ-2 Score 1       Abuse: Current or Past(Physical, Sexual or Emotional)- No  Do you feel safe in your environment? Yes    Social History     Tobacco Use     Smoking status: Never Smoker     Smokeless tobacco: Never Used   Substance Use Topics     Alcohol use: Yes     Comment: 1-2 beers a week, split 2 bottles of wine a weekend with wife     Alcohol Use 1/8/2019   If you drink alcohol do you typically have greater than 3 drinks per day OR greater than 7 drinks per week? No   No flowsheet data found.    Last PSA:   PSA   Date Value Ref Range Status   03/13/2015 1.16 0 - 4 ug/L Final       Reviewed orders with patient. Reviewed health maintenance and updated orders accordingly - Yes       Reviewed and updated as needed this visit by clinical staff  Tobacco  Allergies  Meds  Med Hx  Surg Hx  Fam Hx  Soc Hx        Reviewed and updated as needed this visit by Provider            Review of Systems   Constitutional: Negative for chills and fever.   HENT: Negative for congestion and ear pain.    Eyes: Negative for pain.   Respiratory: Negative for cough.    Cardiovascular: Negative for chest pain.   Gastrointestinal: Negative for abdominal pain,  "constipation, diarrhea and hematochezia.   Genitourinary: Negative for hematuria.   Neurological: Negative for dizziness.   Psychiatric/Behavioral: The patient is not nervous/anxious.             Constant post nasal drip for 8-9 months  meds did not work a while back  Night and day    Hand numbness, entire hand both  After falling down stairs last summer; numbness started a couple months later    Not much computer work    Able to play guitar but difficult    In mid Nov got hit by door in face/ nose    Hears a weird sound both ears  If it is super quiet in environment    Possibly his heartbeat    Not hard to breathe through nose    Tired more than in past    Rash all over upper body    Seeing endocrinology for diabetes    Not much exercise    Lost some wt but stable over the last month    [facility]    3 1/2 hours of sleep    Very hard to get to sleep    Been like this for years    Tried melatonin, warm milk, etc          OBJECTIVE:   /73 (BP Location: Right arm, Patient Position: Chair, Cuff Size: Adult Regular)   Pulse 84   Temp 97.5  F (36.4  C) (Oral)   Ht 1.86 m (6' 1.23\")   Wt 79.9 kg (176 lb 4 oz)   BMI 23.11 kg/m      Physical Exam  GENERAL: healthy, alert and no distress  HENT: ear canals and TM's normal, nose and mouth without ulcers or lesions but nasal mucosa red, swollen  NECK: no adenopathy, no asymmetry, masses, or scars and thyroid normal to palpation  RESP: lungs clear to auscultation - no rales, rhonchi or wheezes  CV: regular rate and rhythm, normal S1 S2, no S3 or S4, no murmur, click or rub, no peripheral edema and peripheral pulses strong  ABDOMEN: soft, nontender, no hepatosplenomegaly, no masses and bowel sounds normal   (male): normal male genitalia without lesions or urethral discharge, no hernia  MS: no gross musculoskeletal defects noted, no edema  SKIN: no suspicious lesions or rashes but has mild tinea versicolor  NEURO: Normal strength and tone, mentation " intact and speech normal  PSYCH: mentation appears normal, affect normal/bright  Foot exam done; mild neuropathy on light touch and vibration  Sensation intact on hands, good strength throughout  Diagnostic Test Results:  none     ASSESSMENT/PLAN:   José Manuel was seen today for physical and health maintenance.    Diagnoses and all orders for this visit:    Encounter for preventative adult health care examination    Nasal obstruction  -     OTOLARYNGOLOGY REFERRAL  -     fluticasone (FLONASE) 50 MCG/ACT nasal spray; Spray 1 spray into both nostrils daily    Type 2 diabetes mellitus with diabetic polyneuropathy, with long-term current use of insulin (H)  -     FOOT EXAM  -     OPTOMETRY REFERRAL  -     Albumin Random Urine Quantitative with Creat Ratio  -     Hemoglobin A1c    Post-nasal drip  -     OTOLARYNGOLOGY REFERRAL    Tinea versicolor  -     ketoconazole (NIZORAL) 2 % external cream; Apply topically 2 times daily as needed for itching    Hyperlipidemia LDL goal <100  -     Comprehensive metabolic panel  -     Lipid panel reflex to direct LDL Non-fasting    Fatigue, unspecified type  -     TSH with free T4 reflex  -     Vitamin D Deficiency  -     CBC with platelets differential  -     Testosterone total    Screening for prostate cancer  -     Prostate spec antigen screen    Other orders  -     Cancel: DIABETES EDUCATOR REFERRAL  -     Cancel: HIV Antigen Antibody Combo  -     Cancel: Hemoglobin A1c  -     Cancel: Lipid panel reflex to direct LDL Fasting  -     Cancel: Creatinine    Discussed multiple issues with patient  [facility] but only [facility] per [facility]  Urged him to schedule eye appointment  Up to date on colonoscopy, had in late 2017  Check labs   Keep working on healthy diet/exercise   Urged him to try nasal steroid for at least a few weeks  After that could see ENT doctor, patient to schedule  Use ketoconazole cream for the tinea versicolor    COUNSELING:   Reviewed preventive health  "counseling, as reflected in patient instructions       Regular exercise       Healthy diet/nutrition       Vision screening       Family planning       Safe sex practices/STD prevention    BP Readings from Last 1 Encounters:   01/08/19 119/73     Estimated body mass index is 23.11 kg/m  as calculated from the following:    Height as of this encounter: 1.86 m (6' 1.23\").    Weight as of this encounter: 79.9 kg (176 lb 4 oz).           reports that  has never smoked. he has never used smokeless tobacco.      Counseling Resources:  ATP IV Guidelines  Pooled Cohorts Equation Calculator  FRAX Risk Assessment  ICSI Preventive Guidelines  Dietary Guidelines for Americans, 2010  USDA's MyPlate  ASA Prophylaxis  Lung CA Screening    Tim Soliman MD  [facility]  "

## 2019-01-09 LAB — DEPRECATED CALCIDIOL+CALCIFEROL SERPL-MC: 21 UG/L (ref 20–75)

## 2019-01-10 LAB — TESTOST SERPL-MCNC: 405 NG/DL (ref 240–950)

## 2019-01-12 NOTE — RESULT ENCOUNTER NOTE
Your diabetes test ( hemoglobin a1c ) is very high.    Call to get in with your endocrinologist very soon.  You need much better control of your sugars.    The vitamin D level is at the low end of normal.  Advise taking an extra 2000 units of over the counter vitamin D3 daily.    Cholesterol is a little high.    Other labs are okay.    Tim Soliman MD

## 2019-01-14 ENCOUNTER — TELEPHONE (OUTPATIENT)
Dept: FAMILY MEDICINE | Facility: CLINIC | Age: 64
End: 2019-01-14

## 2019-01-14 NOTE — TELEPHONE ENCOUNTER
Patient returned call to the nurse triage line, nurse answered. Relayed message below to the patient and he verbalized understanding and agreeable to plan.     Jaclyn Callaway RN

## 2019-01-14 NOTE — TELEPHONE ENCOUNTER
Please call pt regarding results below.  [facility]  Team 3 Coordinator     Your diabetes test ( hemoglobin a1c ) is very high.    Call to get in with your endocrinologist very soon.  You need much better control of your sugars.    The vitamin D level is at the low end of normal.  Advise taking an extra 2000 units of over the counter vitamin D3 daily.    Cholesterol is a little high.    Other labs are okay.    Tim Soliman MD

## 2019-01-14 NOTE — TELEPHONE ENCOUNTER
Called patient at [phone]. Left message to return call to nurse triage line at [phone].    Jaclyn Callaway RN

## 2019-01-15 NOTE — TELEPHONE ENCOUNTER
Patient returned call to RN line and left message for call back to him.   I don't see any new notes or calls, appears he was advised of results yesterday.    I called and spoke to patient, he denies any new concerns or questions.   I advised I don't see any further calls or messages out to him.    Isabelle López, TOBIAS  [facility]

## 2019-02-11 ENCOUNTER — TELEPHONE (OUTPATIENT)
Dept: FAMILY MEDICINE | Facility: CLINIC | Age: 64
End: 2019-02-11

## 2019-02-11 DIAGNOSIS — R09.82 POST-NASAL DRIP: Primary | ICD-10-CM

## 2019-02-11 NOTE — TELEPHONE ENCOUNTER
Reason for call:  Patient reporting a symptom    Symptom or request: Patient calling stating that the Flonase he was prescribed is not working at all. Patient continues to have constant post nasal drip into his throat. He states this has been going on since last May. He is wondering if there is another medication that he can try?     Duration (how long have symptoms been present): see above    Have you been treated for this before? Yes    Additional comments: Patient uses [facility]    Phone Number patient can be reached at:  Home number on file [phone] (home)    Best Time:  any    Can we leave a detailed message on this number:  YES    Call taken on 2/11/2019 at 12:44 PM by Mariana Agudelo

## 2019-02-12 NOTE — TELEPHONE ENCOUNTER
Patient has 2 referrals to ENT in [facility] now, both to [place].    Attempted to call patient at home/mobile number, left message on voicemail; patient was instructed to return call to [facility] RN directly on the RN call back line at [phone]   Isabelle López, RN  [facility]

## 2019-02-12 NOTE — TELEPHONE ENCOUNTER
Patient should see ENT at [place].  I did referral. Please give him the number to call and schedule.    Tim Soliman MD

## 2019-02-12 NOTE — TELEPHONE ENCOUNTER
Patient returned call to RN line which I picked up.   Advised patient of referrals (2 actually) in Epic, provided him with number to call to schedule.    Patient verbalized understanding of and agreement with plan.    Isabelle López RN  [facility]

## 2019-02-18 NOTE — PROGRESS NOTES
I am seeing this patient in consultation for post-nasal drainage at the request of the provider Dr. Tim Soliman.    Chief Complaint - post-nasal drip    History of Present Illness - José Manuel Jernigan is a 63 year old male who presents for evaluation of nasal drainage/post-nasal drainage. The patient describes symptoms of constant feeling of something in throat. Clears throat a lot. Has been going on for the past 9 months. Coughs up clear phlegm. No heartburn or reflux. He doesn't blow out more mucous. He can breath okay. The patient notes no allergies. Treatments have included a medrol dose pack, nasal steroids (flonase, 2 sprays), claritin. The treatments seem to to not help. claritin helps the most. No prior history of nasal surgery, sinus surgery. No history of smoking. No epistaxis. Started a new job in May.     Past Medical History -   Patient Active Problem List   Diagnosis     Advanced directives, counseling/discussion     History of diabetes mellitus     Type 2 diabetes mellitus without complication, with long-term current use of insulin (H)     Hyperlipidemia LDL goal <100     Type 2 diabetes mellitus with diabetic polyneuropathy, with long-term current use of insulin (H)       Current Medications -   Current Outpatient Medications:      aspirin 81 MG tablet, Take 1 tablet (81 mg) by mouth daily, Disp: 90 tablet, Rfl: 3     fluticasone (FLONASE) 50 MCG/ACT nasal spray, Spray 1 spray into both nostrils daily, Disp: 1 Bottle, Rfl: 11     INSULIN ASPART SC, 26 units at bedtime unsure of which insulin though, Disp: , Rfl:      Multiple Vitamins-Minerals (MULTIVITAMIN OR), Take by mouth daily, Disp: , Rfl:      ranitidine (ZANTAC) 150 MG tablet, Take 1 tablet (150 mg) by mouth 2 times daily, Disp: 60 tablet, Rfl: 1    Allergies - No Known Allergies    Social History -   Social History     Socioeconomic History     Marital status:      Spouse name: Not on file     Number of children: 0     Years of  "education: Not on file     Highest education level: Not on file   Social Needs     Financial resource strain: Not on file     Food insecurity - worry: Not on file     Food insecurity - inability: Not on file     Transportation needs - medical: Not on file     Transportation needs - non-medical: Not on file   Occupational History     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 No   Social History Narrative     Not on file       Family History -   Family History   Problem Relation Age of Onset     Other - See Comments Mother         breast ca and liver failure  42     Other - See Comments Father          brain cancer 59     Diabetes Sister        Review of Systems - As per HPI and PMHx, otherwise 10+ comprehensive system review is negative.    Physical Exam  /73   Pulse 86   Ht 1.88 m (6' 2\")   Wt 83.9 kg (185 lb)   SpO2 99%   BMI 23.75 kg/m    General - The patient is in no distress. Alert and oriented to person and place, answers questions and cooperates with examination appropriately.   Neurologic - CN II-XII are grossly intact. No focal neurologic deficits.   Voice and Breathing - The patient was breathing comfortably without the use of accessory muscles. There was no wheezing, stridor, or stertor.  The patients voice was clear and strong.  Eyes - Extraocular movements intact. Sclera were not icteric or injected, conjunctiva were pink and moist.  Mouth - Examination of the oral cavity showed pink, healthy oral mucosa. No lesions or ulcerations noted.  The tongue was mobile and midline, and the dentition were in good condition.    Throat - The " walls of the oropharynx were smooth, pink, moist, symmetric, and had no lesions or ulcerations.  The tonsillar pillars and soft palate were symmetric.  The uvula was midline on elevation. + clear postnasal drainage.  Nose - External contour is symmetric, no gross deflection or scars.  Nasal mucosa is pink and moist with excess clear mucus. The turbinates are somewhat hypertrophic. The septum was midline and non-obstructive.  No polyps, masses, or purulence noted on examination.  Neck -  Palpation of the occipital, submental, submandibular, internal jugular chain, and supraclavicular nodes did not demonstrate any abnormal lymph nodes or masses. No parotid masses. Palpation of the thyroid was soft and smooth, with no nodules or goiter appreciated.  The trachea was mobile and midline.      A/P - José Manuel Jernigan is a 63 year old male with postnasal drainage due to chronic rhinitis.  It is possible this is a new allergy or dust allergy.  He is in a new environment at work since this all began.  We may want to consider allergy testing at some point, or a different antihistamine.  He did try Claritin and had modest improvement. I recommend treatment with saline irrigations and Atrovent.  We did discuss Clarifix as an option if we feel confident that this is truly postnasal drainage and rhinitis.  I will have him return in 1 month.      Herbert Bravo MD  Otolaryngology  [facility]

## 2019-02-19 ENCOUNTER — OFFICE VISIT (OUTPATIENT)
Dept: OTOLARYNGOLOGY | Facility: CLINIC | Age: 64
End: 2019-02-19
Payer: COMMERCIAL

## 2019-02-19 VITALS
HEIGHT: 74 IN | WEIGHT: 185 LBS | SYSTOLIC BLOOD PRESSURE: 116 MMHG | BODY MASS INDEX: 23.74 KG/M2 | HEART RATE: 86 BPM | OXYGEN SATURATION: 99 % | DIASTOLIC BLOOD PRESSURE: 73 MMHG

## 2019-02-19 DIAGNOSIS — R09.82 PND (POST-NASAL DRIP): Primary | ICD-10-CM

## 2019-02-19 PROCEDURE — 99243 OFF/OP CNSLTJ NEW/EST LOW 30: CPT | Performed by: OTOLARYNGOLOGY

## 2019-02-19 RX ORDER — IPRATROPIUM BROMIDE 42 UG/1
2 SPRAY, METERED NASAL 4 TIMES DAILY PRN
Qty: 1 BOX | Refills: 3 | Status: SHIPPED | OUTPATIENT
Start: 2019-02-19 | End: 2020-01-15

## 2019-02-19 RX ORDER — IPRATROPIUM BROMIDE 42 UG/1
2 SPRAY, METERED NASAL 4 TIMES DAILY PRN
Qty: 1 BOX | Refills: 3 | Status: SHIPPED | OUTPATIENT
Start: 2019-02-19 | End: 2020-02-19

## 2019-02-19 ASSESSMENT — MIFFLIN-ST. JEOR: SCORE: 1703.9

## 2019-02-19 NOTE — LETTER
2/19/2019         RE: José Manuel Jernigan  [address] [facility] [address]        Dear Colleague,    Thank you for referring your patient, José Manuel Jernigan, to the [facility]. Please see a copy of my visit note below.    I am seeing this patient in consultation for post-nasal drainage at the request of the provider Dr. Tim Soliman.    Chief Complaint - post-nasal drip    History of Present Illness - José Manuel Jernigan is a 63 year old male who presents for evaluation of nasal drainage/post-nasal drainage. The patient describes symptoms of constant feeling of something in throat. Clears throat a lot. Has been going on for the past 9 months. Coughs up clear phlegm. No heartburn or reflux. He doesn't blow out more mucous. He can breath okay. The patient notes no allergies. Treatments have included a medrol dose pack, nasal steroids (flonase, 2 sprays), claritin. The treatments seem to to not help. claritin helps the most. No prior history of nasal surgery, sinus surgery. No history of smoking. No epistaxis. Started a new job in May.     Past Medical History -   Patient Active Problem List   Diagnosis     Advanced directives, counseling/discussion     History of diabetes mellitus     Type 2 diabetes mellitus without complication, with long-term current use of insulin (H)     Hyperlipidemia LDL goal <100     Type 2 diabetes mellitus with diabetic polyneuropathy, with long-term current use of insulin (H)       Current Medications -   Current Outpatient Medications:      aspirin 81 MG tablet, Take 1 tablet (81 mg) by mouth daily, Disp: 90 tablet, Rfl: 3     fluticasone (FLONASE) 50 MCG/ACT nasal spray, Spray 1 spray into both nostrils daily, Disp: 1 Bottle, Rfl: 11     INSULIN ASPART SC, 26 units at bedtime unsure of which insulin though, Disp: , Rfl:      Multiple Vitamins-Minerals (MULTIVITAMIN OR), Take by mouth daily, Disp: , Rfl:      ranitidine (ZANTAC) 150 MG tablet, Take 1  "tablet (150 mg) by mouth 2 times daily, Disp: 60 tablet, Rfl: 1    Allergies - No Known Allergies    Social History -   Social History     Socioeconomic History     Marital status:      Spouse name: Not on file     Number of children: 0     Years of education: Not on file     Highest education level: Not on file   Social Needs     Financial resource strain: Not on file     Food insecurity - worry: Not on file     Food insecurity - inability: Not on file     Transportation needs - medical: Not on file     Transportation needs - non-medical: Not on file   Occupational History     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 No   Social History Narrative     Not on file       Family History -   Family History   Problem Relation Age of Onset     Other - See Comments Mother         breast ca and liver failure  42     Other - See Comments Father          brain cancer 59     Diabetes Sister        Review of Systems - As per HPI and PMHx, otherwise 10+ comprehensive system review is negative.    Physical Exam  /73   Pulse 86   Ht 1.88 m (6' 2\")   Wt 83.9 kg (185 lb)   SpO2 99%   BMI 23.75 kg/m     General - The patient is in no distress. Alert and oriented to person and place, answers questions and cooperates with examination appropriately.   Neurologic - CN II-XII are grossly intact. No focal neurologic deficits.   Voice and Breathing - The patient was breathing comfortably without the use of accessory muscles. There was no wheezing, stridor, or stertor.  The patients voice was clear and strong.  Eyes - Extraocular movements intact. Sclera were " not icteric or injected, conjunctiva were pink and moist.  Mouth - Examination of the oral cavity showed pink, healthy oral mucosa. No lesions or ulcerations noted.  The tongue was mobile and midline, and the dentition were in good condition.    Throat - The walls of the oropharynx were smooth, pink, moist, symmetric, and had no lesions or ulcerations.  The tonsillar pillars and soft palate were symmetric.  The uvula was midline on elevation. + clear postnasal drainage.  Nose - External contour is symmetric, no gross deflection or scars.  Nasal mucosa is pink and moist with excess clear mucus. The turbinates are somewhat hypertrophic. The septum was midline and non-obstructive.  No polyps, masses, or purulence noted on examination.  Neck -  Palpation of the occipital, submental, submandibular, internal jugular chain, and supraclavicular nodes did not demonstrate any abnormal lymph nodes or masses. No parotid masses. Palpation of the thyroid was soft and smooth, with no nodules or goiter appreciated.  The trachea was mobile and midline.      A/P - José Manuel Jernigan is a 63 year old male with postnasal drainage due to chronic rhinitis.  It is possible this is a new allergy or dust allergy.  He is in a new environment at work since this all began.  We may want to consider allergy testing at some point, or a different antihistamine.  He did try Claritin and had modest improvement. I recommend treatment with saline irrigations and Atrovent.  We did discuss Clarifix as an option if we feel confident that this is truly postnasal drainage and rhinitis.  I will have him return in 1 month.      Herbert Bravo MD  Otolaryngology  [facility]        Again, thank you for allowing me to participate in the care of your patient.        Sincerely,        Herbert Bravo MD

## 2019-02-19 NOTE — PATIENT INSTRUCTIONS
General Scheduling Information  To schedule your CT/MRI scan, please contact [place] [facility] at [phone]   [address] [place] [address]    To schedule your Surgery, please contact our Specialty Schedulers at [phone]    ENT Clinic Locations Clinic Hours Telephone Number     Nadeen Tim  [address] [place] [address]   Tuesday:       8:00am -- 4:00pm    Wednesday:  8:00am - 4:00pm   To schedule an appointment with   Dr. Bravo,   please contact our   Specialty Scheduling Department at:     [phone]       Nadeen Pizarro  [address] [place] [address]   Friday:          8:00am - 4:00pm         Urgent Care Locations Clinic Hours Telephone Numbers     Nadeen Izaguirre  [address] [place] [address]     Monday-Friday:     11:00pm - 9:00pm    Saturday-Sunday:  9:00am - 5:00pm   [phone]     Nadeen Pizarro  [address] [place] [address]     Monday-Friday:      5:00pm - 9:00pm     Saturday-Sunday:  9:00am - 5:00pm   [phone]

## 2019-09-11 ENCOUNTER — OFFICE VISIT (OUTPATIENT)
Dept: OTOLARYNGOLOGY | Facility: CLINIC | Age: 64
End: 2019-09-11
Payer: COMMERCIAL

## 2019-09-11 VITALS
BODY MASS INDEX: 23.74 KG/M2 | WEIGHT: 185 LBS | DIASTOLIC BLOOD PRESSURE: 64 MMHG | OXYGEN SATURATION: 97 % | SYSTOLIC BLOOD PRESSURE: 100 MMHG | HEIGHT: 74 IN | HEART RATE: 73 BPM

## 2019-09-11 DIAGNOSIS — R09.82 PND (POST-NASAL DRIP): Primary | ICD-10-CM

## 2019-09-11 PROCEDURE — 99214 OFFICE O/P EST MOD 30 MIN: CPT | Performed by: OTOLARYNGOLOGY

## 2019-09-11 RX ORDER — IPRATROPIUM BROMIDE 42 UG/1
2 SPRAY, METERED NASAL 4 TIMES DAILY PRN
Qty: 15 ML | Refills: 3 | Status: SHIPPED | OUTPATIENT
Start: 2019-09-11 | End: 2021-02-12

## 2019-09-11 ASSESSMENT — MIFFLIN-ST. JEOR: SCORE: 1703.9

## 2019-09-11 NOTE — PROGRESS NOTES
Chief Complaint - post-nasal drip    History of Present Illness - José Manuel Jernigan is a 63 year old male who returns for evaluation of nasal drainage/post-nasal drainage. The patient describes symptoms of constant feeling of something in throat. Clears throat a lot. Has been going on for the over a year. Coughs up clear phlegm. He doesn't blow out more mucous. He can breath okay through nose. The patient notes no allergies. Treatments have included a medrol dose pack, nasal steroids (flonase), and claritin. The treatments seem to to not help. No prior history of nasal surgery, sinus surgery. No history of smoking. No epistaxis. I had him try atrovent and nasal saline irrigations when I saw him in 2/2019. He returns and notes the atrovent helped a lot. However, symptoms came back when he stopped atrovent. Still has clear postnasal drainage. He denies acid reflux. He doesn't want to use atrovent forever and is seeking a long term solution.    Past Medical History -   Patient Active Problem List   Diagnosis     Advanced directives, counseling/discussion     History of diabetes mellitus     Type 2 diabetes mellitus without complication, with long-term current use of insulin (H)     Hyperlipidemia LDL goal <100     Type 2 diabetes mellitus with diabetic polyneuropathy, with long-term current use of insulin (H)       Current Medications -   Current Outpatient Medications:      aspirin 81 MG tablet, Take 1 tablet (81 mg) by mouth daily, Disp: 90 tablet, Rfl: 3     fluticasone (FLONASE) 50 MCG/ACT nasal spray, Spray 1 spray into both nostrils daily, Disp: 1 Bottle, Rfl: 11     INSULIN ASPART SC, 26 units at bedtime unsure of which insulin though, Disp: , Rfl:      ipratropium (ATROVENT) 0.06 % nasal spray, Spray 2 sprays into both nostrils 4 times daily as needed, Disp: 1 Box, Rfl: 3     ipratropium (ATROVENT) 0.06 % nasal spray, Spray 2 sprays into both nostrils 4 times daily as needed for rhinitis, Disp: 1 Box, Rfl:  "3     Multiple Vitamins-Minerals (MULTIVITAMIN OR), Take by mouth daily, Disp: , Rfl:      ranitidine (ZANTAC) 150 MG tablet, Take 1 tablet (150 mg) by mouth 2 times daily, Disp: 60 tablet, Rfl: 1    Allergies - No Known Allergies    Social History -   Social History     Socioeconomic History     Marital status:      Spouse name: Not on file     Number of children: 0     Years of education: Not on file     Highest education level: Not on file   Social Needs     Financial resource strain: Not on file     Food insecurity - worry: Not on file     Food insecurity - inability: Not on file     Transportation needs - medical: Not on file     Transportation needs - non-medical: Not on file   Occupational History     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 No   Social History Narrative     Not on file       Family History -   Family History   Problem Relation Age of Onset     Other - See Comments Mother         breast ca and liver failure  42     Other - See Comments Father          brain cancer 59     Diabetes Sister      Review of Systems - As per HPI and PMHx, otherwise 7 system review of the head and neck is negative.    Physical Exam  /64   Pulse 73   Ht 1.88 m (6' 2\")   Wt 83.9 kg (185 lb)   SpO2 97%   BMI 23.75 kg/m    General - The patient is in no distress. Alert and oriented to person and place, answers questions and cooperates with examination appropriately.   Neurologic - CN II-XII are grossly intact. No focal neurologic deficits.   Voice and Breathing - The patient was breathing comfortably without the use of accessory muscles. " There was no wheezing, stridor, or stertor.  The patients voice was clear and strong.  Eyes - Extraocular movements intact. Sclera were not icteric or injected, conjunctiva were pink and moist.  Mouth - Examination of the oral cavity showed pink, healthy oral mucosa. No lesions or ulcerations noted.  The tongue was mobile and midline, and the dentition were in good condition.    Throat - The walls of the oropharynx were smooth, pink, moist, symmetric, and had no lesions or ulcerations.  The tonsillar pillars and soft palate were symmetric.  The uvula was midline on elevation. + clear postnasal drainage.  Nose - External contour is symmetric, no gross deflection or scars.  Nasal mucosa is pink and moist with excess clear mucus. The turbinates are somewhat hypertrophic. The septum was midline and non-obstructive.  No polyps, masses, or purulence noted on examination.  Neck -  Palpation of the occipital, submental, submandibular, internal jugular chain, and supraclavicular nodes did not demonstrate any abnormal lymph nodes or masses. No parotid masses. Palpation of the thyroid was soft and smooth, with no nodules or goiter appreciated.  The trachea was mobile and midline.      A/P - José Manuel Jernigan is a 63 year old male with postnasal drainage due to chronic rhinitis. Atrovent helped a lot, but he doesn't want to continue this forever. Symptoms returned after he stopped this.  We did discuss Clarifix as an option. I will contact the company for the device, and then contact the patient on returning to clinic for the procedure. We discussed the procedure, risks, benefits, and alternatives including: pain, headache, failure to help symptoms, the need for medication or additional procedures in the future.       Herbert Bravo MD  Otolaryngology  [facility]

## 2019-09-11 NOTE — PATIENT INSTRUCTIONS
General Scheduling Information  To schedule your CT/MRI scan, please contact [facility] [facility] at [phone] [address] [place] [address]    To schedule your Surgery, please contact our Specialty Schedulers at [phone]    ENT Clinic Locations Clinic Hours Telephone Number     Nadeen Tim  [address] [place] [address]   Tuesday:       8:00am -- 4:00pm    Wednesday:  8:00am - 4:00pm   To schedule an appointment with   Dr. Bravo,   please contact our   Specialty Scheduling Department at:     [phone]       Nadeen Pizarro  [address] [place] [address]   Friday:          8:00am - 4:00pm         Urgent Care Locations Clinic Hours Telephone Numbers     Nadeen Izaguirre  [address] [place] [address]     Monday-Friday:     11:00pm - 9:00pm    Saturday-Sunday:  9:00am - 5:00pm   [phone]     Nadeen Pizarro  [address] [place] [address]     Monday-Friday:      5:00pm - 9:00pm     Saturday-Sunday:  9:00am - 5:00pm   [phone]

## 2019-09-11 NOTE — LETTER
9/11/2019         RE: José Manuel Jernigan  [address] [facility] [address]        Dear Colleague,    Thank you for referring your patient, José Manuel Jernigan, to the [facility]. Please see a copy of my visit note below.    Chief Complaint - post-nasal drip    History of Present Illness - José Manuel Jernigan is a 63 year old male who returns for evaluation of nasal drainage/post-nasal drainage. The patient describes symptoms of constant feeling of something in throat. Clears throat a lot. Has been going on for the over a year. Coughs up clear phlegm. He doesn't blow out more mucous. He can breath okay through nose. The patient notes no allergies. Treatments have included a medrol dose pack, nasal steroids (flonase), and claritin. The treatments seem to to not help. No prior history of nasal surgery, sinus surgery. No history of smoking. No epistaxis. I had him try atrovent and nasal saline irrigations when I saw him in 2/2019. He returns and notes the atrovent helped a lot. However, symptoms came back when he stopped atrovent. Still has clear postnasal drainage. He denies acid reflux. He doesn't want to use atrovent forever and is seeking a long term solution.    Past Medical History -   Patient Active Problem List   Diagnosis     Advanced directives, counseling/discussion     History of diabetes mellitus     Type 2 diabetes mellitus without complication, with long-term current use of insulin (H)     Hyperlipidemia LDL goal <100     Type 2 diabetes mellitus with diabetic polyneuropathy, with long-term current use of insulin (H)       Current Medications -   Current Outpatient Medications:      aspirin 81 MG tablet, Take 1 tablet (81 mg) by mouth daily, Disp: 90 tablet, Rfl: 3     fluticasone (FLONASE) 50 MCG/ACT nasal spray, Spray 1 spray into both nostrils daily, Disp: 1 Bottle, Rfl: 11     INSULIN ASPART SC, 26 units at bedtime unsure of which insulin though, Disp: , Rfl:       "ipratropium (ATROVENT) 0.06 % nasal spray, Spray 2 sprays into both nostrils 4 times daily as needed, Disp: 1 Box, Rfl: 3     ipratropium (ATROVENT) 0.06 % nasal spray, Spray 2 sprays into both nostrils 4 times daily as needed for rhinitis, Disp: 1 Box, Rfl: 3     Multiple Vitamins-Minerals (MULTIVITAMIN OR), Take by mouth daily, Disp: , Rfl:      ranitidine (ZANTAC) 150 MG tablet, Take 1 tablet (150 mg) by mouth 2 times daily, Disp: 60 tablet, Rfl: 1    Allergies - No Known Allergies    Social History -   Social History     Socioeconomic History     Marital status:      Spouse name: Not on file     Number of children: 0     Years of education: Not on file     Highest education level: Not on file   Social Needs     Financial resource strain: Not on file     Food insecurity - worry: Not on file     Food insecurity - inability: Not on file     Transportation needs - medical: Not on file     Transportation needs - non-medical: Not on file   Occupational History     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 No   Social History Narrative     Not on file       Family History -   Family History   Problem Relation Age of Onset     Other - See Comments Mother         breast ca and liver failure  42     Other - See Comments Father          brain cancer 59     Diabetes Sister      Review of Systems - As per HPI and PMHx, otherwise 7 system review of the head and neck is negative.    Physical Exam  /64   Pulse 73   Ht 1.88 m (6' 2\")   Wt 83.9 kg (185 lb)   SpO2 97%   BMI 23.75 kg/m     General - The patient is in no distress. Alert and oriented to " person and place, answers questions and cooperates with examination appropriately.   Neurologic - CN II-XII are grossly intact. No focal neurologic deficits.   Voice and Breathing - The patient was breathing comfortably without the use of accessory muscles. There was no wheezing, stridor, or stertor.  The patients voice was clear and strong.  Eyes - Extraocular movements intact. Sclera were not icteric or injected, conjunctiva were pink and moist.  Mouth - Examination of the oral cavity showed pink, healthy oral mucosa. No lesions or ulcerations noted.  The tongue was mobile and midline, and the dentition were in good condition.    Throat - The walls of the oropharynx were smooth, pink, moist, symmetric, and had no lesions or ulcerations.  The tonsillar pillars and soft palate were symmetric.  The uvula was midline on elevation. + clear postnasal drainage.  Nose - External contour is symmetric, no gross deflection or scars.  Nasal mucosa is pink and moist with excess clear mucus. The turbinates are somewhat hypertrophic. The septum was midline and non-obstructive.  No polyps, masses, or purulence noted on examination.  Neck -  Palpation of the occipital, submental, submandibular, internal jugular chain, and supraclavicular nodes did not demonstrate any abnormal lymph nodes or masses. No parotid masses. Palpation of the thyroid was soft and smooth, with no nodules or goiter appreciated.  The trachea was mobile and midline.      A/P - José Manuel Jernigan is a 63 year old male with postnasal drainage due to chronic rhinitis. Atrovent helped a lot, but he doesn't want to continue this forever. Symptoms returned after he stopped this.  We did discuss Clarifix as an option. I will contact the company for the device, and then contact the patient on returning to clinic for the procedure. We discussed the procedure, risks, benefits, and alternatives including: pain, headache, failure to help symptoms, the need for medication  or additional procedures in the future.       Herbert Bravo MD  Otolaryngology  [facility]        Again, thank you for allowing me to participate in the care of your patient.        Sincerely,        Herbert Bravo MD

## 2019-11-25 ENCOUNTER — TELEPHONE (OUTPATIENT)
Dept: OTOLARYNGOLOGY | Facility: CLINIC | Age: 64
End: 2019-11-25

## 2019-12-04 ENCOUNTER — OFFICE VISIT (OUTPATIENT)
Dept: OTOLARYNGOLOGY | Facility: CLINIC | Age: 64
End: 2019-12-04
Payer: COMMERCIAL

## 2019-12-04 VITALS
SYSTOLIC BLOOD PRESSURE: 130 MMHG | HEART RATE: 82 BPM | OXYGEN SATURATION: 98 % | BODY MASS INDEX: 23.75 KG/M2 | DIASTOLIC BLOOD PRESSURE: 80 MMHG | WEIGHT: 185 LBS

## 2019-12-04 DIAGNOSIS — R09.82 PND (POST-NASAL DRIP): Primary | ICD-10-CM

## 2019-12-04 DIAGNOSIS — J30.0 VASOMOTOR RHINITIS: ICD-10-CM

## 2019-12-04 PROCEDURE — 30117 REMOVAL OF INTRANASAL LESION: CPT | Mod: 50 | Performed by: OTOLARYNGOLOGY

## 2019-12-04 PROCEDURE — 31231 NASAL ENDOSCOPY DX: CPT | Mod: 51 | Performed by: OTOLARYNGOLOGY

## 2019-12-04 PROCEDURE — 99207 ZZC NO CHARGE LOS: CPT | Performed by: OTOLARYNGOLOGY

## 2019-12-04 NOTE — LETTER
12/4/2019         RE: José Manuel Jernigan  [address] [facility] [address]        Dear Colleague,    Thank you for referring your patient, José Manuel Jernigan, to the [facility]. Please see a copy of my visit note below.    Procedure - bilateral clarifix nasal cryotherapy (see clinic note from 9/11/19)    I discussed the risks, benefits, and alternatives to the procedure including: Failure to relieve symptoms, bleeding, infection, headache, the need for additional procedures or revision procedure, the need for possible continued medical treatment.  The patient agreed and wished to proceed.  Informed consent was signed and scanned into the chart.    Both nasal cavities were sprayed with 4% lidocaine with phenylephrine.  I then placed 2 cotton pledgets soaked with 4% lidocaine with phenylephrine in each nasal cavity.  Careful attention was paid using a 0 degree 2.7 mm rigid endoscope to place one pledget into the bilateral middle meatus.  The pledgets were then left for approximately 10 to 15 minutes to provide adequate topical anesthesia.  I then remove the pledgets.  I then began on the left side using the rigid endoscope and a 27-gauge spinal needle I injected 1% lidocaine 1:100,000 epinephrine into the left sphenopalatine area of the posterior lateral nasal wall.  I then performed this procedure on the right using the rigid endoscope and a bent spinal needle I injected the right sphenopalatine area.  Next on the left side using the 0 degree rigid endoscope I used the clarifix device to place it in the most posterior aspect of the middle meatus against the left posterior lateral nasal wall.  Device was then turned on for 30 seconds of cryotherapy.  It was then discontinued and removed after 30 seconds of thawing. Because there was not a nice white pierre of the left posterior lateral nasal wall and the left middle meatus, I advanced the device and did the procedure again. This did  result in a nice white spot more posteriorly. I then performed a similar procedure on the right.  Using the endoscope, I placed the tip of the clarifix in the right posterior lateral middle meatus against the lateral wall.  The device was then turned on for 30 seconds of cryotherapy.  It was then discontinued and allowed to thaw.  The device was removed and there was adequate white blanching of the right posterior middle meatus lateral wall.  Everything was removed from the nose and there is good hemostasis.  The procedure was complete.    A/P - bilateral clarifix nasal cryotherapy was performed today.  The patient was reminded about the possibility of an ice cream headache and that they should drink warm beverages and that this will resolve.  No significant nose blowing for 1 week.  The patient was also reminded that the results may take 4 to 6 weeks to fully take effect.  They should contact me sooner with any concerns or questions.  Return in 4 to 6 weeks.      Again, thank you for allowing me to participate in the care of your patient.        Sincerely,        Herbert Bravo MD

## 2019-12-04 NOTE — PATIENT INSTRUCTIONS
General Scheduling Information  To schedule your CT/MRI scan, please contact [facility] [facility] at [phone]   [address] [place] [address]    To schedule your Surgery, please contact our Specialty Schedulers at [phone]    ENT Clinic Locations Clinic Hours Telephone Number     Nadeen Tim  [address] [place] [address]   Tuesday:       8:00am -- 4:00pm    Wednesday:  8:00am - 4:00pm   To schedule an appointment with   Dr. Bravo,   please contact our   Specialty Scheduling Department at:     [phone]       Nadeen Pizarro  [address] [place] [address]   Friday:          8:00am - 4:00pm         Urgent Care Locations Clinic Hours Telephone Numbers     Nadeen Izaguirre  [address] [place] [address]     Monday-Friday:     11:00pm - 9:00pm    Saturday-Sunday:  9:00am - 5:00pm   [phone]     Nadeen Pizarro  [address] [place] [address]     Monday-Friday:      5:00pm - 9:00pm     Saturday-Sunday:  9:00am - 5:00pm   [phone]

## 2019-12-04 NOTE — PROGRESS NOTES
Procedure - bilateral clarifix nasal cryotherapy (see clinic note from 9/11/19)    I discussed the risks, benefits, and alternatives to the procedure including: Failure to relieve symptoms, bleeding, infection, headache, the need for additional procedures or revision procedure, the need for possible continued medical treatment.  The patient agreed and wished to proceed.  Informed consent was signed and scanned into the chart.    Both nasal cavities were sprayed with 4% lidocaine with phenylephrine.  I then placed 2 cotton pledgets soaked with 4% lidocaine with phenylephrine in each nasal cavity.  Careful attention was paid using a 0 degree 2.7 mm rigid endoscope to place one pledget into the bilateral middle meatus.  The pledgets were then left for approximately 10 to 15 minutes to provide adequate topical anesthesia.  I then remove the pledgets.  I then began on the left side using the rigid endoscope and a 27-gauge spinal needle I injected 1% lidocaine 1:100,000 epinephrine into the left sphenopalatine area of the posterior lateral nasal wall.  I then performed this procedure on the right using the rigid endoscope and a bent spinal needle I injected the right sphenopalatine area.  Next on the left side using the 0 degree rigid endoscope I used the clarifix device to place it in the most posterior aspect of the middle meatus against the left posterior lateral nasal wall.  Device was then turned on for 30 seconds of cryotherapy.  It was then discontinued and removed after 30 seconds of thawing. Because there was not a nice white pierre of the left posterior lateral nasal wall and the left middle meatus, I advanced the device and did the procedure again. This did result in a nice white spot more posteriorly. I then performed a similar procedure on the right.  Using the endoscope, I placed the tip of the clarifix in the right posterior lateral middle meatus against the lateral wall.  The device was then turned on for  30 seconds of cryotherapy.  It was then discontinued and allowed to thaw.  The device was removed and there was adequate white blanching of the right posterior middle meatus lateral wall.  Everything was removed from the nose and there is good hemostasis.  The procedure was complete.    A/P - bilateral clarifix nasal cryotherapy was performed today.  The patient was reminded about the possibility of an ice cream headache and that they should drink warm beverages and that this will resolve.  No significant nose blowing for 1 week.  The patient was also reminded that the results may take 4 to 6 weeks to fully take effect.  They should contact me sooner with any concerns or questions.  Return in 4 to 6 weeks.

## 2020-01-15 ENCOUNTER — OFFICE VISIT (OUTPATIENT)
Dept: OTOLARYNGOLOGY | Facility: CLINIC | Age: 65
End: 2020-01-15
Payer: COMMERCIAL

## 2020-01-15 VITALS
WEIGHT: 185 LBS | RESPIRATION RATE: 16 BRPM | HEIGHT: 74 IN | DIASTOLIC BLOOD PRESSURE: 76 MMHG | SYSTOLIC BLOOD PRESSURE: 132 MMHG | HEART RATE: 82 BPM | OXYGEN SATURATION: 96 % | BODY MASS INDEX: 23.74 KG/M2

## 2020-01-15 DIAGNOSIS — R09.82 PND (POST-NASAL DRIP): ICD-10-CM

## 2020-01-15 PROCEDURE — 99213 OFFICE O/P EST LOW 20 MIN: CPT | Performed by: OTOLARYNGOLOGY

## 2020-01-15 RX ORDER — IPRATROPIUM BROMIDE 42 UG/1
2 SPRAY, METERED NASAL 4 TIMES DAILY PRN
Qty: 1 BOX | Refills: 11 | Status: SHIPPED | OUTPATIENT
Start: 2020-01-15 | End: 2021-02-12

## 2020-01-15 ASSESSMENT — MIFFLIN-ST. JEOR: SCORE: 1698.9

## 2020-01-15 NOTE — PATIENT INSTRUCTIONS
General Scheduling Information  To schedule your CT/MRI scan, please contact [facility] [facility] at [phone] [address] [place] [address]    To schedule your Surgery, please contact our Specialty Schedulers at [phone]    ENT Clinic Locations Clinic Hours Telephone Number     Nadeen Tim  [address] [place] [address]   Tuesday:       8:00am -- 4:00pm    Wednesday:  8:00am - 4:00pm   To schedule an appointment with   Dr. Bravo,   please contact our   Specialty Scheduling Department at:     [phone]       Nadeen Pizarro  [address] [place] [address]   Friday:          8:00am - 4:00pm         Urgent Care Locations Clinic Hours Telephone Numbers     Nadeen Izaguirre  [address] [place] [address]     Monday-Friday:     11:00pm - 9:00pm    Saturday-Sunday:  9:00am - 5:00pm   [phone]     Nadeen Pizarro  [address] [place] [address]     Monday-Friday:      5:00pm - 9:00pm     Saturday-Sunday:  9:00am - 5:00pm   [phone]

## 2020-01-15 NOTE — LETTER
1/15/2020         RE: José Manuel Jernigan  [address] [facility] [address]        Dear Colleague,    Thank you for referring your patient, José Manuel Jernigan, to the [facility]. Please see a copy of my visit note below.    Chief Complaint - post-nasal drip recheck    History of Present Illness - José Manuel Jernigan is a 63 year old male who returns for evaluation of nasal drainage/post-nasal drainage. He is s/p Clarifix procedure on 12/4/19. Medical treatments prior to the procedure included a medrol dose pack, nasal steroids (flonase), atrovent and claritin. atrovent did help some. No prior history of sinus surgery. No history of smoking. No epistaxis. He returns and notes he is better, but still has some drainage. He has to use the atrovent once daily. No epistaxis. No purulent drainage. Breaths well through nose.     Past Medical History -   Patient Active Problem List   Diagnosis     Advanced directives, counseling/discussion     History of diabetes mellitus     Type 2 diabetes mellitus without complication, with long-term current use of insulin (H)     Hyperlipidemia LDL goal <100     Type 2 diabetes mellitus with diabetic polyneuropathy, with long-term current use of insulin (H)       Current Medications -   Current Outpatient Medications:      aspirin 81 MG tablet, Take 1 tablet (81 mg) by mouth daily, Disp: 90 tablet, Rfl: 3     INSULIN ASPART SC, 26 units at bedtime unsure of which insulin though, Disp: , Rfl:      ipratropium (ATROVENT) 0.06 % nasal spray, Spray 2 sprays into both nostrils 4 times daily as needed for rhinitis (Patient not taking: Reported on 12/4/2019), Disp: 15 mL, Rfl: 3     ipratropium (ATROVENT) 0.06 % nasal spray, Spray 2 sprays into both nostrils 4 times daily as needed, Disp: 1 Box, Rfl: 3     ipratropium (ATROVENT) 0.06 % nasal spray, Spray 2 sprays into both nostrils 4 times daily as needed for rhinitis (Patient not taking: Reported on 12/4/2019),  Disp: 1 Box, Rfl: 3     Multiple Vitamins-Minerals (MULTIVITAMIN OR), Take by mouth daily, Disp: , Rfl:      ranitidine (ZANTAC) 150 MG tablet, Take 1 tablet (150 mg) by mouth 2 times daily (Patient not taking: Reported on 2019), Disp: 60 tablet, Rfl: 1    Allergies - No Known Allergies    Social History -   Social History     Socioeconomic History     Marital status:      Spouse name: Not on file     Number of children: 0     Years of education: Not on file     Highest education level: Not on file   Social Needs     Financial resource strain: Not on file     Food insecurity - worry: Not on file     Food insecurity - inability: Not on file     Transportation needs - medical: Not on file     Transportation needs - non-medical: Not on file   Occupational History     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 No   Social History Narrative     Not on file       Family History -   Family History   Problem Relation Age of Onset     Other - See Comments Mother         breast ca and liver failure  42     Other - See Comments Father          brain cancer 59     Diabetes Sister      Review of Systems - As per HPI and PMHx, otherwise 7 system review of the head and neck is negative.    Physical Exam  General - The patient is in no distress. Alert and oriented to person and place, answers questions and cooperates with examination appropriately.   Neurologic - CN II-XII are grossly intact. No focal neurologic deficits.   Voice and Breathing - The patient was breathing comfortably without the use of accessory muscles. There was no wheezing, stridor, or  stertor.  The patients voice was clear and strong.  Eyes - Extraocular movements intact. Sclera were not icteric or injected, conjunctiva were pink and moist.  Mouth - Examination of the oral cavity showed pink, healthy oral mucosa. No lesions or ulcerations noted.  The tongue was mobile and midline, and the dentition were in good condition.    Throat - The walls of the oropharynx were smooth, pink, moist, symmetric, and had no lesions or ulcerations.  The tonsillar pillars and soft palate were symmetric.  The uvula was midline on elevation. mild clear postnasal drainage.  Nose - External contour is symmetric, no gross deflection or scars.  Nasal mucosa is pink and moist with excess clear mucus. The turbinates are normal. The septum was midline and non-obstructive.  No polyps, masses, or purulence noted on examination.  Neck -  Palpation of the occipital, submental, submandibular, internal jugular chain, and supraclavicular nodes did not demonstrate any abnormal lymph nodes or masses. No parotid masses. Palpation of the thyroid was soft and smooth, with no nodules or goiter appreciated.  The trachea was mobile and midline.      A/P - José Manuel Jernigan is a 63 year old male with postnasal drainage due to chronic rhinitis. Atrovent helped a lot, but he didn't want to continue this forever. He is now 1 month out from the Clarifix procedure. He notes he has had improvement but still gets intermittent thick postnasal drainage.  Therefore he started the Atrovent again on his own and has noted he is to use this less than before the procedure.  He only feels he needs Atrovent about once a day.  There still is a little bit of time for improvement following the procedure.  Certainly can continue the Atrovent and I provided refills.  If things worsen he should return.      Herbert Bravo MD  Otolaryngology  [facility]        Again, thank you for allowing me to participate in the care of your patient.         Sincerely,        Herbert Bravo MD

## 2020-01-15 NOTE — PROGRESS NOTES
Chief Complaint - post-nasal drip recheck    History of Present Illness - José Manuel Jernigan is a 63 year old male who returns for evaluation of nasal drainage/post-nasal drainage. He is s/p Clarifix procedure on 12/4/19. Medical treatments prior to the procedure included a medrol dose pack, nasal steroids (flonase), atrovent and claritin. atrovent did help some. No prior history of sinus surgery. No history of smoking. No epistaxis. He returns and notes he is better, but still has some drainage. He has to use the atrovent once daily. No epistaxis. No purulent drainage. Breaths well through nose.     Past Medical History -   Patient Active Problem List   Diagnosis     Advanced directives, counseling/discussion     History of diabetes mellitus     Type 2 diabetes mellitus without complication, with long-term current use of insulin (H)     Hyperlipidemia LDL goal <100     Type 2 diabetes mellitus with diabetic polyneuropathy, with long-term current use of insulin (H)       Current Medications -   Current Outpatient Medications:      aspirin 81 MG tablet, Take 1 tablet (81 mg) by mouth daily, Disp: 90 tablet, Rfl: 3     INSULIN ASPART SC, 26 units at bedtime unsure of which insulin though, Disp: , Rfl:      ipratropium (ATROVENT) 0.06 % nasal spray, Spray 2 sprays into both nostrils 4 times daily as needed for rhinitis (Patient not taking: Reported on 12/4/2019), Disp: 15 mL, Rfl: 3     ipratropium (ATROVENT) 0.06 % nasal spray, Spray 2 sprays into both nostrils 4 times daily as needed, Disp: 1 Box, Rfl: 3     ipratropium (ATROVENT) 0.06 % nasal spray, Spray 2 sprays into both nostrils 4 times daily as needed for rhinitis (Patient not taking: Reported on 12/4/2019), Disp: 1 Box, Rfl: 3     Multiple Vitamins-Minerals (MULTIVITAMIN OR), Take by mouth daily, Disp: , Rfl:      ranitidine (ZANTAC) 150 MG tablet, Take 1 tablet (150 mg) by mouth 2 times daily (Patient not taking: Reported on 12/4/2019), Disp: 60 tablet, Rfl:  1    Allergies - No Known Allergies    Social History -   Social History     Socioeconomic History     Marital status:      Spouse name: Not on file     Number of children: 0     Years of education: Not on file     Highest education level: Not on file   Social Needs     Financial resource strain: Not on file     Food insecurity - worry: Not on file     Food insecurity - inability: Not on file     Transportation needs - medical: Not on file     Transportation needs - non-medical: Not on file   Occupational History     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 No   Social History Narrative     Not on file       Family History -   Family History   Problem Relation Age of Onset     Other - See Comments Mother         breast ca and liver failure  42     Other - See Comments Father          brain cancer 59     Diabetes Sister      Review of Systems - As per HPI and PMHx, otherwise 7 system review of the head and neck is negative.    Physical Exam  General - The patient is in no distress. Alert and oriented to person and place, answers questions and cooperates with examination appropriately.   Neurologic - CN II-XII are grossly intact. No focal neurologic deficits.   Voice and Breathing - The patient was breathing comfortably without the use of accessory muscles. There was no wheezing, stridor, or stertor.  The patients voice was clear and strong.  Eyes - Extraocular movements intact. Sclera were not icteric or injected, conjunctiva were pink and moist.  Mouth - Examination of the oral cavity showed pink, healthy oral mucosa. No lesions or ulcerations  noted.  The tongue was mobile and midline, and the dentition were in good condition.    Throat - The walls of the oropharynx were smooth, pink, moist, symmetric, and had no lesions or ulcerations.  The tonsillar pillars and soft palate were symmetric.  The uvula was midline on elevation. mild clear postnasal drainage.  Nose - External contour is symmetric, no gross deflection or scars.  Nasal mucosa is pink and moist with excess clear mucus. The turbinates are normal. The septum was midline and non-obstructive.  No polyps, masses, or purulence noted on examination.  Neck -  Palpation of the occipital, submental, submandibular, internal jugular chain, and supraclavicular nodes did not demonstrate any abnormal lymph nodes or masses. No parotid masses. Palpation of the thyroid was soft and smooth, with no nodules or goiter appreciated.  The trachea was mobile and midline.      A/P - José Manuel Jernigan is a 63 year old male with postnasal drainage due to chronic rhinitis. Atrovent helped a lot, but he didn't want to continue this forever. He is now 1 month out from the Clarifix procedure. He notes he has had improvement but still gets intermittent thick postnasal drainage.  Therefore he started the Atrovent again on his own and has noted he is to use this less than before the procedure.  He only feels he needs Atrovent about once a day.  There still is a little bit of time for improvement following the procedure.  Certainly can continue the Atrovent and I provided refills.  If things worsen he should return.      Herbert Bravo MD  Otolaryngology  [facility] [place]

## 2020-03-26 ENCOUNTER — TELEPHONE (OUTPATIENT)
Dept: FAMILY MEDICINE | Facility: CLINIC | Age: 65
End: 2020-03-26

## 2020-03-26 NOTE — TELEPHONE ENCOUNTER
Patient has not been seen since 1/8/19.  Note indicates has diabetes managed by endocrinology.    Attempted to call patient at home/mobile number, left message on voicemail; patient was instructed to return call to [facility] clinic RN directly on the RN call back line at [phone]   Isabelle López RN  [facility]

## 2020-03-26 NOTE — TELEPHONE ENCOUNTER
Reason for call:  Patient reporting a symptom    Symptom or request: vomiting    Duration (how long have symptoms been present): 1 day    Have you been treated for this before? No    Additional comments: Patient asked if a nurse could call back to discuss his symptoms.    Phone Number patient can be reached at:  Home number on file [phone] (home)    Best Time:  Anytime    Can we leave a detailed message on this number:  YES    Call taken on 3/26/2020 at 12:37 PM by Catherine Broderick

## 2020-03-27 NOTE — TELEPHONE ENCOUNTER
RN spoke with patient. He states his symptoms have completely resolved, has no concerns at this time.     Bree Barrera RN, BSN, PHN  [facility] [place]

## 2021-02-12 ENCOUNTER — OFFICE VISIT (OUTPATIENT)
Dept: FAMILY MEDICINE | Facility: CLINIC | Age: 66
End: 2021-02-12
Payer: COMMERCIAL

## 2021-02-12 VITALS
SYSTOLIC BLOOD PRESSURE: 138 MMHG | TEMPERATURE: 97.8 F | DIASTOLIC BLOOD PRESSURE: 74 MMHG | HEIGHT: 74 IN | WEIGHT: 198.13 LBS | BODY MASS INDEX: 25.43 KG/M2 | HEART RATE: 78 BPM

## 2021-02-12 DIAGNOSIS — Z79.4 TYPE 2 DIABETES MELLITUS WITHOUT COMPLICATION, WITH LONG-TERM CURRENT USE OF INSULIN (H): Primary | ICD-10-CM

## 2021-02-12 DIAGNOSIS — K21.9 GASTROESOPHAGEAL REFLUX DISEASE, UNSPECIFIED WHETHER ESOPHAGITIS PRESENT: ICD-10-CM

## 2021-02-12 DIAGNOSIS — Z12.5 SCREENING FOR PROSTATE CANCER: ICD-10-CM

## 2021-02-12 DIAGNOSIS — Z12.11 SCREENING FOR COLON CANCER: ICD-10-CM

## 2021-02-12 DIAGNOSIS — E11.9 TYPE 2 DIABETES MELLITUS WITHOUT COMPLICATION, WITH LONG-TERM CURRENT USE OF INSULIN (H): Primary | ICD-10-CM

## 2021-02-12 DIAGNOSIS — E78.5 HYPERLIPIDEMIA LDL GOAL <100: ICD-10-CM

## 2021-02-12 DIAGNOSIS — J31.0 CHRONIC RHINITIS: ICD-10-CM

## 2021-02-12 DIAGNOSIS — R53.83 FATIGUE, UNSPECIFIED TYPE: ICD-10-CM

## 2021-02-12 DIAGNOSIS — G62.9 PERIPHERAL POLYNEUROPATHY: ICD-10-CM

## 2021-02-12 LAB
ALBUMIN SERPL-MCNC: 4.3 G/DL (ref 3.4–5)
ALP SERPL-CCNC: 80 U/L (ref 40–150)
ALT SERPL W P-5'-P-CCNC: 27 U/L (ref 0–70)
ANION GAP SERPL CALCULATED.3IONS-SCNC: 5 MMOL/L (ref 3–14)
AST SERPL W P-5'-P-CCNC: 8 U/L (ref 0–45)
BASOPHILS # BLD AUTO: 0 10E9/L (ref 0–0.2)
BASOPHILS NFR BLD AUTO: 0.6 %
BILIRUB SERPL-MCNC: 0.2 MG/DL (ref 0.2–1.3)
BUN SERPL-MCNC: 18 MG/DL (ref 7–30)
CALCIUM SERPL-MCNC: 9.3 MG/DL (ref 8.5–10.1)
CHLORIDE SERPL-SCNC: 104 MMOL/L (ref 94–109)
CHOLEST SERPL-MCNC: 252 MG/DL
CO2 SERPL-SCNC: 30 MMOL/L (ref 20–32)
CREAT SERPL-MCNC: 0.66 MG/DL (ref 0.66–1.25)
CREAT UR-MCNC: 73 MG/DL
DIFFERENTIAL METHOD BLD: NORMAL
EOSINOPHIL # BLD AUTO: 0.1 10E9/L (ref 0–0.7)
EOSINOPHIL NFR BLD AUTO: 2 %
ERYTHROCYTE [DISTWIDTH] IN BLOOD BY AUTOMATED COUNT: 13.7 % (ref 10–15)
GFR SERPL CREATININE-BSD FRML MDRD: >90 ML/MIN/{1.73_M2}
GLUCOSE SERPL-MCNC: 267 MG/DL (ref 70–99)
HBA1C MFR BLD: 11.1 % (ref 0–5.6)
HCT VFR BLD AUTO: 45.7 % (ref 40–53)
HDLC SERPL-MCNC: 93 MG/DL
HGB BLD-MCNC: 15 G/DL (ref 13.3–17.7)
LDLC SERPL CALC-MCNC: 115 MG/DL
LYMPHOCYTES # BLD AUTO: 1.1 10E9/L (ref 0.8–5.3)
LYMPHOCYTES NFR BLD AUTO: 22.7 %
MCH RBC QN AUTO: 29.6 PG (ref 26.5–33)
MCHC RBC AUTO-ENTMCNC: 32.8 G/DL (ref 31.5–36.5)
MCV RBC AUTO: 90 FL (ref 78–100)
MICROALBUMIN UR-MCNC: 20 MG/L
MICROALBUMIN/CREAT UR: 27.05 MG/G CR (ref 0–17)
MONOCYTES # BLD AUTO: 0.6 10E9/L (ref 0–1.3)
MONOCYTES NFR BLD AUTO: 11 %
NEUTROPHILS # BLD AUTO: 3.2 10E9/L (ref 1.6–8.3)
NEUTROPHILS NFR BLD AUTO: 63.7 %
NONHDLC SERPL-MCNC: 159 MG/DL
PLATELET # BLD AUTO: 222 10E9/L (ref 150–450)
POTASSIUM SERPL-SCNC: 4.7 MMOL/L (ref 3.4–5.3)
PROT SERPL-MCNC: 7.4 G/DL (ref 6.8–8.8)
PSA SERPL-ACNC: 1.01 UG/L (ref 0–4)
RBC # BLD AUTO: 5.06 10E12/L (ref 4.4–5.9)
SODIUM SERPL-SCNC: 139 MMOL/L (ref 133–144)
TRIGL SERPL-MCNC: 222 MG/DL
TSH SERPL DL<=0.005 MIU/L-ACNC: 1.04 MU/L (ref 0.4–4)
WBC # BLD AUTO: 5 10E9/L (ref 4–11)

## 2021-02-12 PROCEDURE — 36415 COLL VENOUS BLD VENIPUNCTURE: CPT | Performed by: FAMILY MEDICINE

## 2021-02-12 PROCEDURE — 82043 UR ALBUMIN QUANTITATIVE: CPT | Performed by: FAMILY MEDICINE

## 2021-02-12 PROCEDURE — 99207 PR FOOT EXAM NO CHARGE: CPT | Mod: 25 | Performed by: FAMILY MEDICINE

## 2021-02-12 PROCEDURE — 80050 GENERAL HEALTH PANEL: CPT | Performed by: FAMILY MEDICINE

## 2021-02-12 PROCEDURE — 83036 HEMOGLOBIN GLYCOSYLATED A1C: CPT | Performed by: FAMILY MEDICINE

## 2021-02-12 PROCEDURE — 80061 LIPID PANEL: CPT | Performed by: FAMILY MEDICINE

## 2021-02-12 PROCEDURE — G0103 PSA SCREENING: HCPCS | Performed by: FAMILY MEDICINE

## 2021-02-12 PROCEDURE — 99214 OFFICE O/P EST MOD 30 MIN: CPT | Performed by: FAMILY MEDICINE

## 2021-02-12 RX ORDER — FLUTICASONE PROPIONATE 50 MCG
1 SPRAY, SUSPENSION (ML) NASAL DAILY
Qty: 16 G | Refills: 11 | Status: SHIPPED | OUTPATIENT
Start: 2021-02-12 | End: 2021-06-14

## 2021-02-12 ASSESSMENT — MIFFLIN-ST. JEOR: SCORE: 1753.44

## 2021-02-12 NOTE — PROGRESS NOTES
"         Martha Georges is a 65 year old who presents for the following health issues     HPI       Nasal drip for the past couple years that has caused him to cough. He did see ENT in the past for this and was given a nasal spray which helped but then it stopped working    Review of Systems   Draining down back of throat  From head,  Not chest    Clear phlegm    Breathing  Fine     No chest pain     Some problems at night  [  Already had sleep problems    No throat pain    Cryotherapy did not help much      Recently worse  No triggers    Weather/ temperature/ food no difference     No ear symptoms     Sees endocrinology for diabetes, better    No heartburn at all    Not much drainage  From nose      This all started soon after a fall down stairs a few years ago    Feet and hands almost always numb    Sugars better recently      Objective    /74 (BP Location: Right arm, Patient Position: Chair, Cuff Size: Adult Regular)   Pulse 78   Temp 97.8  F (36.6  C) (Oral)   Ht 1.88 m (6' 2\")   Wt 89.9 kg (198 lb 2 oz)   BMI 25.44 kg/m    Body mass index is 25.44 kg/m .  Physical Exam  Constitutional:       Appearance: He is well-developed.   HENT:      Head: Normocephalic and atraumatic.      Right Ear: Tympanic membrane, ear canal and external ear normal.      Left Ear: Tympanic membrane, ear canal and external ear normal.      Mouth/Throat:      Mouth: Mucous membranes are moist.      Pharynx: Oropharynx is clear.   Eyes:      Conjunctiva/sclera: Conjunctivae normal.   Neck:      Vascular: No carotid bruit.   Cardiovascular:      Rate and Rhythm: Normal rate and regular rhythm.      Heart sounds: Normal heart sounds.   Pulmonary:      Effort: Pulmonary effort is normal. No respiratory distress.      Breath sounds: Normal breath sounds.   Neurological:      Mental Status: He is alert and oriented to person, place, and time.      Cranial Nerves: No cranial nerve deficit.   Psychiatric:         Speech: Speech " normal.         Behavior: Behavior normal.      some  Mild swelling of nasal mucosa    No sinus/ submandib drainage     foot exam showed good skin but some  Decreased sensation      ASSESSMENT / PLAN:  (E11.9,  Z79.4) Type 2 diabetes mellitus without complication, with long-term current use of insulin (H)  (primary encounter diagnosis)  Comment: check labs.  [facility].    Plan: EYE ADULT REFERRAL, Hemoglobin A1c, Albumin         Random Urine Quantitative with Creat Ratio,         FOOT EXAM             (R53.83) Fatigue, unspecified type  Comment: check labs   Plan: TSH with free T4 reflex, CBC with platelets         differential             (Z12.5) Screening for prostate cancer  Comment: psa  Plan: Prostate spec antigen screen             (E78.5) Hyperlipidemia LDL goal <100  Comment: check lipids fasting   Plan: Lipid panel reflex to direct LDL Fasting,         Comprehensive metabolic panel             (Z12.11) Screening for colon cancer  Comment: due for colonoscopy  Plan: GASTROENTEROLOGY ADULT REF PROCEDURE ONLY             (K21.9) Gastroesophageal reflux disease, unspecified whether esophagitis present  Comment: prudent to do egd also   Plan: GASTROENTEROLOGY ADULT REF PROCEDURE ONLY             (J31.0) Chronic rhinitis  Comment: trial of flonase   Plan: fluticasone (FLONASE) 50 MCG/ACT nasal spray             (G62.9) Peripheral polyneuropathy  Comment: not much pain so hold off on gabapentin type med   Plan: monitor symptoms.  [facility]       I reviewed the patient's medications, allergies, medical history, family history, and social history.    Tim Soliman MD

## 2021-02-12 NOTE — LETTER
February 15, 2021      José Manuel Jernigan  [address] [facility] [address]        Dear ,    We are writing to inform you of your test results.    We did check diabetes test, hemoglobin a1c.  Quite high.  Follow up with your diabetes doctors soon.  Also talk with them about starting a lisinopril or losartan type blood pressure med given your protein in the urine.     Cholesterol and triglycerides are elevated.     Other labs are okay.       Resulted Orders   Hemoglobin A1c   Result Value Ref Range    Hemoglobin A1C 11.1 (H) 0 - 5.6 %      Comment:      Normal <5.7% Prediabetes 5.7-6.4%  Diabetes 6.5% or higher - adopted from ADA   consensus guidelines.     Lipid panel reflex to direct LDL Fasting   Result Value Ref Range    Cholesterol 252 (H) <200 mg/dL      Comment:      Desirable:       <200 mg/dl    Triglycerides 222 (H) <150 mg/dL      Comment:      Borderline high:  150-199 mg/dl  High:             200-499 mg/dl  Very high:       >499 mg/dl  Fasting specimen      HDL Cholesterol 93 >39 mg/dL    LDL Cholesterol Calculated 115 (H) <100 mg/dL      Comment:      Above desirable:  100-129 mg/dl  Borderline High:  130-159 mg/dL  High:             160-189 mg/dL  Very high:       >189 mg/dl      Non HDL Cholesterol 159 (H) <130 mg/dL      Comment:      Above Desirable:  130-159 mg/dl  Borderline high:  160-189 mg/dl  High:             190-219 mg/dl  Very high:       >219 mg/dl     Albumin Random Urine Quantitative with Creat Ratio   Result Value Ref Range    Creatinine Urine 73 mg/dL    Albumin Urine mg/L 20 mg/L    Albumin Urine mg/g Cr 27.05 (H) 0 - 17 mg/g Cr   TSH with free T4 reflex   Result Value Ref Range    TSH 1.04 0.40 - 4.00 mU/L   Comprehensive metabolic panel   Result Value Ref Range    Sodium 139 133 - 144 mmol/L    Potassium 4.7 3.4 - 5.3 mmol/L    Chloride 104 94 - 109 mmol/L    Carbon Dioxide 30 20 - 32 mmol/L    Anion Gap 5 3 - 14 mmol/L    Glucose 267 (H) 70 - 99 mg/dL       Comment:      Fasting specimen    Urea Nitrogen 18 7 - 30 mg/dL    Creatinine 0.66 0.66 - 1.25 mg/dL    GFR Estimate >90 >60 mL/min/[1.73_m2]      Comment:      Non  GFR Calc  Starting 12/18/2018, serum creatinine based estimated GFR (eGFR) will be   calculated using the Chronic Kidney Disease Epidemiology Collaboration   (CKD-EPI) equation.      GFR Estimate If Black >90 >60 mL/min/[1.73_m2]      Comment:       GFR Calc  Starting 12/18/2018, serum creatinine based estimated GFR (eGFR) will be   calculated using the Chronic Kidney Disease Epidemiology Collaboration   (CKD-EPI) equation.      Calcium 9.3 8.5 - 10.1 mg/dL    Bilirubin Total 0.2 0.2 - 1.3 mg/dL    Albumin 4.3 3.4 - 5.0 g/dL    Protein Total 7.4 6.8 - 8.8 g/dL    Alkaline Phosphatase 80 40 - 150 U/L    ALT 27 0 - 70 U/L    AST 8 0 - 45 U/L   CBC with platelets differential   Result Value Ref Range    WBC 5.0 4.0 - 11.0 10e9/L    RBC Count 5.06 4.4 - 5.9 10e12/L    Hemoglobin 15.0 13.3 - 17.7 g/dL    Hematocrit 45.7 40.0 - 53.0 %    MCV 90 78 - 100 fl    MCH 29.6 26.5 - 33.0 pg    MCHC 32.8 31.5 - 36.5 g/dL    RDW 13.7 10.0 - 15.0 %    Platelet Count 222 150 - 450 10e9/L    % Neutrophils 63.7 %    % Lymphocytes 22.7 %    % Monocytes 11.0 %    % Eosinophils 2.0 %    % Basophils 0.6 %    Absolute Neutrophil 3.2 1.6 - 8.3 10e9/L    Absolute Lymphocytes 1.1 0.8 - 5.3 10e9/L    Absolute Monocytes 0.6 0.0 - 1.3 10e9/L    Absolute Eosinophils 0.1 0.0 - 0.7 10e9/L    Absolute Basophils 0.0 0.0 - 0.2 10e9/L    Diff Method Automated Method    Prostate spec antigen screen   Result Value Ref Range    PSA 1.01 0 - 4 ug/L      Comment:      Assay Method:  Chemiluminescence using Siemens Vista analyzer       If you have any questions or concerns, please call the clinic at the number listed above.       Sincerely,      Tim Soliman MD/gonzales

## 2021-02-12 NOTE — PATIENT INSTRUCTIONS
Stay well hydrated    Could use over the counter mucinex / guafenesin to thin the mucus    Trial of flonase nasal spray  2 sprays each nostril once daily for at least a few weeks    Schedule the upper and lower scope exam

## 2021-02-14 NOTE — RESULT ENCOUNTER NOTE
We did check diabetes test, hemoglobin a1c.  Quite high.  Follow up with your diabetes doctors soon.  Also talk with them about starting a lisinopril or losartan type blood pressure med given your protein in the urine.    Cholesterol and triglycerides are elevated.    Other labs are okay.    Tim Soliman MD

## 2021-06-04 ENCOUNTER — NURSE TRIAGE (OUTPATIENT)
Dept: NURSING | Facility: CLINIC | Age: 66
End: 2021-06-04

## 2021-06-04 NOTE — TELEPHONE ENCOUNTER
Low blood pressure  Last 2-3 weeks feeling lightheaded and tired after one flight of stairs. After resting he feels better. Heavy breathing and a little tightness in the chest. Denied chest pain. After climbing the stairs he needs to lean against the wall to catch his breath and that is when he feels the chest tightness. Like someone is sitting on my chest. Pain lasts less than 1 minute.   He feels generally exhausted after one flight of stairs.   Advised ER for evaluation.   COVID 19 Nurse Triage Plan/Patient Instructions    Please be aware that novel coronavirus (COVID-19) may be circulating in the community. If you develop symptoms such as fever, cough, or SOB or if you have concerns about the presence of another infection including coronavirus (COVID-19), please contact your health care provider or visit [email or web address].     Disposition/Instructions    ED Visit recommended. Follow protocol based instructions.     Bring Your Own Device:  Please also bring your smart device(s) (smart phones, tablets, laptops) and their charging cables for your personal use and to communicate with your care team during your visit.    Thank you for taking steps to prevent the spread of this virus.  o Limit your contact with others.  o Wear a simple mask to cover your cough.  o Wash your hands well and often.    Resources    [facility]: About COVID-19: [email or web address]    CDC: What to Do If You're Sick: www.cdc.gov/coronavirus/2019-ncov/about/steps-when-sick.html    CDC: Ending Home Isolation: www.cdc.gov/coronavirus/2019-ncov/hcp/disposition-in-home-patients.html     CDC: Caring for Someone: www.cdc.gov/coronavirus/2019-ncov/if-you-are-sick/care-for-someone.html     [facility]: Interim Guidance for Hospital Discharge to Home: www.health.[facility].mn.us/diseases/coronavirus/hcp/hospdischarge.pdf    [facility] clinical trials (COVID-19 research studies): [email or web address]      Below are the COVID-19 hotlines at the [place] Department of Health ([facility]). Interpreters are available.   o For health questions: Call 328-184-0600 or 1-726.669.4628 (7 a.m. to 7 p.m.)  o For questions about schools and childcare: Call 730-671-7982 or 1-143.636.6658 (7 a.m. to 7 p.m.)                   Alicja Sierra RN on 6/4/2021 at 1:44 PM      Reason for Disposition    Spinning or tilting sensation (vertigo) present now and one or more stroke risk factors (i.e., hypertension, diabetes, prior stroke/TIA, heart attack, age over 60) (Exception: prior physician evaluation for this AND no different/worse than usual)    Chest pain or 'angina' comes and goes and is happening more often (increasing in frequency) or getting worse (increasing in severity) (Exception: chest pains that last only a few seconds)    Dizziness or lightheadedness    MODERATE difficulty breathing (e.g., speaks in phrases, SOB even at rest, pulse 100-120) of new onset or worse than normal    Additional Information    Negative: Shock suspected (e.g., cold/pale/clammy skin, too weak to stand, low BP, rapid pulse)    Negative: Difficult to awaken or acting confused (e.g., disoriented, slurred speech)    Negative: Fainted, and still feels dizzy afterwards    Negative: Severe difficulty breathing (e.g., struggling for each breath, speaks in single words)    Negative: Overdose (accidental or intentional) of medications    Negative: New neurologic deficit that is present now: * Weakness of the face, arm, or leg on one side of the body * Numbness of the face, arm, or leg on one side of the body * Loss of speech or garbled speech    Negative: Heart beating < 50 beats per minute OR > 140 beats per minute    Negative: Sounds like a life-threatening emergency to the triager    Negative: Chest pain    Negative: Rectal bleeding, bloody stool, or tarry-black stool    Negative: Vomiting is the main symptom    Negative: Diarrhea is the main symptom     Negative: Headache is the main symptom    Negative: Heat exhaustion suspected (i.e., dehydration from heat exposure)    Negative: Patient states that he/she is having an anxiety/panic attack    Negative: SEVERE dizziness (e.g., unable to stand, requires support to walk, feels like passing out now)    Negative: SEVERE headache or neck pain    Negative: Severe difficulty breathing (e.g., struggling for each breath, speaks in single words)    Negative: Passed out (i.e., fainted, collapsed and was not responding)    Negative: Difficult to awaken or acting confused (e.g., disoriented, slurred speech)    Negative: Shock suspected (e.g., cold/pale/clammy skin, too weak to stand, low BP, rapid pulse)    Negative: Chest pain lasting longer than 5 minutes and ANY of the following:* Over 45 years old* Over 30 years old and at least one cardiac risk factor (e.g., diabetes, high blood pressure, high cholesterol, smoker, or strong family history of heart disease)* History of heart disease (i.e., angina, heart attack, heart failure, bypass surgery, takes nitroglycerin)* Pain is crushing, pressure-like, or heavy    Negative: Heart beating < 50 beats per minute OR > 140 beats per minute    Negative: Visible sweat on face or sweat dripping down face    Negative: Sounds like a life-threatening emergency to the triager    Negative: Followed an injury to chest    Negative: Chest pain lasting longer than 5 minutes and occurred in last 3 days (72 hours) (Exception: feels exactly the same as previously diagnosed heartburn and has accompanying sour taste in mouth)    Negative: SEVERE chest pain    Negative: Pain also in shoulder(s) or arm(s) or jaw    Negative: Difficulty breathing    Negative: Cocaine use within last 3 days    Negative: Major surgery in the past month    Negative: Hip or leg fracture (broken bone) in past month (or had cast on leg or ankle in past month)    Negative: Illness requiring prolonged bedrest in past month (e.g.,  immobilization, long hospital stay)    Negative: Long-distance travel in past month (e.g., car, bus, train, plane; with trip lasting 6 or more hours)    Negative: History of prior 'blood clot' in leg or lungs (i.e., deep vein thrombosis, pulmonary embolism)    Negative: History of inherited increased risk of blood clots (e.g., Factor 5 Leiden, Anti-thrombin 3, Protein C or Protein S deficiency, Prothrombin mutation)    Negative: Heart beating irregularly or very rapidly    Negative: Breathing stopped and hasn't returned    Negative: Choking on something    Negative: SEVERE difficulty breathing (e.g., struggling for each breath, speaks in single words, pulse > 120)    Negative: Bluish (or gray) lips or face    Negative: Difficult to awaken or acting confused (e.g., disoriented, slurred speech)    Negative: Passed out (i.e., fainted, collapsed and was not responding)    Negative: Wheezing started suddenly after medicine, an allergic food, or bee sting    Negative: Stridor    Negative: Slow, shallow and weak breathing    Negative: Sounds like a life-threatening emergency to the triager    Negative: Chest pain    Negative: Wheezing (high pitched whistling sound) and previous asthma attacks or use of asthma medicines    Negative: Difficulty breathing and only present when coughing    Negative: Difficulty breathing and only from stuffy or runny nose    Negative: Difficulty breathing and within 14 days of COVID-19 Exposure    Protocols used: DIZZINESS-A-OH, CHEST PAIN-A-OH, BREATHING DIFFICULTY-A-OH

## 2021-06-14 ENCOUNTER — OFFICE VISIT (OUTPATIENT)
Dept: FAMILY MEDICINE | Facility: CLINIC | Age: 66
End: 2021-06-14
Payer: COMMERCIAL

## 2021-06-14 VITALS
OXYGEN SATURATION: 97 % | BODY MASS INDEX: 24.78 KG/M2 | SYSTOLIC BLOOD PRESSURE: 111 MMHG | WEIGHT: 193 LBS | HEART RATE: 81 BPM | TEMPERATURE: 97.9 F | DIASTOLIC BLOOD PRESSURE: 66 MMHG

## 2021-06-14 DIAGNOSIS — Z79.4 TYPE 2 DIABETES MELLITUS WITH DIABETIC POLYNEUROPATHY, WITH LONG-TERM CURRENT USE OF INSULIN (H): ICD-10-CM

## 2021-06-14 DIAGNOSIS — G63 POLYNEUROPATHY ASSOCIATED WITH UNDERLYING DISEASE (H): ICD-10-CM

## 2021-06-14 DIAGNOSIS — R06.09 DYSPNEA ON EXERTION: Primary | ICD-10-CM

## 2021-06-14 DIAGNOSIS — J31.0 CHRONIC RHINITIS: ICD-10-CM

## 2021-06-14 DIAGNOSIS — L98.9 SKIN LESIONS: ICD-10-CM

## 2021-06-14 DIAGNOSIS — E11.42 TYPE 2 DIABETES MELLITUS WITH DIABETIC POLYNEUROPATHY, WITH LONG-TERM CURRENT USE OF INSULIN (H): ICD-10-CM

## 2021-06-14 PROCEDURE — 99214 OFFICE O/P EST MOD 30 MIN: CPT | Performed by: FAMILY MEDICINE

## 2021-06-14 RX ORDER — GABAPENTIN 100 MG/1
CAPSULE ORAL
Qty: 90 CAPSULE | Refills: 1 | Status: SHIPPED | OUTPATIENT
Start: 2021-06-14

## 2021-06-14 RX ORDER — FLUTICASONE PROPIONATE 50 MCG
1 SPRAY, SUSPENSION (ML) NASAL DAILY
Qty: 16 G | Refills: 11 | Status: SHIPPED | OUTPATIENT
Start: 2021-06-14

## 2021-06-14 ASSESSMENT — PAIN SCALES - GENERAL: PAINLEVEL: NO PAIN (0)

## 2021-06-14 NOTE — PATIENT INSTRUCTIONS
Keep appointments for stress test and cardiology    Stay on aspirin daily    Schedule with dermatology    Start gabapentin, gradually increase as needed    Follow up in 1-2 months in clinic about neuropathy

## 2021-06-14 NOTE — PROGRESS NOTES
Martha Georges is a 65 year old who presents for the following health issues    HPI     ED/UC Followup:    Facility:  [facility]  Date of visit: 6/4/21  Reason for visit: GALLEGOS  Current Status: No change, still has symptoms.            Review of Systems   To emergency room 10 days ago    Symptoms same    Lightheaded when standing up or climbing stairs    Short of breath with minimal exertion    No chest pain    Chest feels a little tight at top of stairs    No history of heart issues    Sugars quite variable    Sees endocrinology          Objective    /66 (BP Location: Right arm, Patient Position: Chair, Cuff Size: Adult Regular)   Pulse 81   Temp 97.9  F (36.6  C) (Oral)   Wt 87.5 kg (193 lb)   SpO2 97%   BMI 24.78 kg/m    Body mass index is 24.78 kg/m .  Physical Exam  Constitutional:       Appearance: He is well-developed.   HENT:      Head: Normocephalic and atraumatic.   Eyes:      Conjunctiva/sclera: Conjunctivae normal.   Neck:      Vascular: No carotid bruit.   Cardiovascular:      Rate and Rhythm: Normal rate and regular rhythm.      Heart sounds: Normal heart sounds.   Pulmonary:      Effort: Pulmonary effort is normal. No respiratory distress.      Breath sounds: Normal breath sounds.   Neurological:      Mental Status: He is alert and oriented to person, place, and time.      Cranial Nerves: No cranial nerve deficit.   Psychiatric:         Speech: Speech normal.         Behavior: Behavior normal.         sensation to light touch okay in hands but decreased vibratory sense distally    Has a brown macular lesion on left cheek but the more worrisome skin changes are on scalp; patient has hair loss on scalp         ASSESSMENT / PLAN:  (R06.00) Dyspnea on exertion  (primary encounter diagnosis)  Comment: agree with plan for stress test and then cardiology consult; this may be a new anginal equivalent  Plan: as above     (E11.42,  Z79.4) Type 2 diabetes mellitus with diabetic  polyneuropathy, with long-term current use of insulin (H)  Comment: patient advised to go back to endocrinologist soon to help get diabetes under good control  Plan: as above     (G63) Polyneuropathy associated with underlying disease (H)  Comment: trial of low dose gabapentin, increase gradually   Plan: gabapentin (NEURONTIN) 100 MG capsule           Then follow up in a couple months     (J31.0) Chronic rhinitis  Comment: refill med; restart this   Plan: fluticasone (FLONASE) 50 MCG/ACT nasal spray             (L98.9) Skin lesions  Comment: patient should see dermatology   Plan: ADULT DERMATOLOGY REFERRAL        Patient to call and schedule        I reviewed the patient's medications, allergies, medical history, family history, and social history.    Tim Soliman MD

## 2021-09-24 ENCOUNTER — NURSE TRIAGE (OUTPATIENT)
Dept: FAMILY MEDICINE | Facility: CLINIC | Age: 66
End: 2021-09-24
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
## 2021-09-28 ENCOUNTER — OFFICE VISIT (OUTPATIENT)
Dept: FAMILY MEDICINE | Facility: CLINIC | Age: 66
End: 2021-09-28
Payer: COMMERCIAL

## 2021-09-28 ENCOUNTER — ANCILLARY PROCEDURE (OUTPATIENT)
Dept: GENERAL RADIOLOGY | Facility: CLINIC | Age: 66
End: 2021-09-28
Attending: FAMILY MEDICINE
Payer: COMMERCIAL

## 2021-09-28 VITALS
DIASTOLIC BLOOD PRESSURE: 76 MMHG | OXYGEN SATURATION: 99 % | TEMPERATURE: 97.9 F | SYSTOLIC BLOOD PRESSURE: 122 MMHG | WEIGHT: 193.6 LBS | BODY MASS INDEX: 24.86 KG/M2 | HEART RATE: 89 BPM

## 2021-09-28 DIAGNOSIS — S93.401A INVERSION SPRAIN OF RIGHT ANKLE, INITIAL ENCOUNTER: ICD-10-CM

## 2021-09-28 DIAGNOSIS — S93.401A INVERSION SPRAIN OF RIGHT ANKLE, INITIAL ENCOUNTER: Primary | ICD-10-CM

## 2021-09-28 PROCEDURE — 99214 OFFICE O/P EST MOD 30 MIN: CPT | Performed by: FAMILY MEDICINE

## 2021-09-28 PROCEDURE — 73610 X-RAY EXAM OF ANKLE: CPT | Mod: RT | Performed by: RADIOLOGY

## 2021-09-28 PROCEDURE — 73630 X-RAY EXAM OF FOOT: CPT | Mod: RT | Performed by: RADIOLOGY

## 2021-09-28 RX ORDER — INSULIN GLARGINE 100 [IU]/ML
INJECTION, SOLUTION SUBCUTANEOUS
COMMUNITY
Start: 2021-08-02

## 2021-09-28 RX ORDER — ATORVASTATIN CALCIUM 80 MG/1
40 TABLET, FILM COATED ORAL DAILY
COMMUNITY
Start: 2021-09-16
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
## 2021-09-30 ENCOUNTER — TELEPHONE (OUTPATIENT)
Dept: FAMILY MEDICINE | Facility: CLINIC | Age: 66
End: 2021-09-30
Payer: COMMERCIAL

## 2021-10-07 ENCOUNTER — ANCILLARY PROCEDURE (OUTPATIENT)
Dept: GENERAL RADIOLOGY | Facility: CLINIC | Age: 66
End: 2021-10-07
Attending: PODIATRIST
Payer: COMMERCIAL

## 2021-10-07 ENCOUNTER — OFFICE VISIT (OUTPATIENT)
Dept: PODIATRY | Facility: CLINIC | Age: 66
End: 2021-10-07
Attending: FAMILY MEDICINE
Payer: COMMERCIAL

## 2021-10-07 VITALS
WEIGHT: 193 LBS | BODY MASS INDEX: 24.78 KG/M2 | HEART RATE: 87 BPM | DIASTOLIC BLOOD PRESSURE: 67 MMHG | SYSTOLIC BLOOD PRESSURE: 127 MMHG

## 2021-10-07 DIAGNOSIS — S92.351A CLOSED FRACTURE OF BASE OF FIFTH METATARSAL BONE OF RIGHT FOOT, INITIAL ENCOUNTER: ICD-10-CM

## 2021-10-07 DIAGNOSIS — S92.351A CLOSED FRACTURE OF BASE OF FIFTH METATARSAL BONE OF RIGHT FOOT, INITIAL ENCOUNTER: Primary | ICD-10-CM

## 2021-10-07 DIAGNOSIS — S93.401A INVERSION SPRAIN OF RIGHT ANKLE, INITIAL ENCOUNTER: ICD-10-CM

## 2021-10-07 PROCEDURE — 28470 CLTX METATARSAL FX WO MNP EA: CPT | Mod: RT | Performed by: PODIATRIST

## 2021-10-07 PROCEDURE — 73630 X-RAY EXAM OF FOOT: CPT | Mod: RT | Performed by: RADIOLOGY

## 2021-10-07 PROCEDURE — 99203 OFFICE O/P NEW LOW 30 MIN: CPT | Mod: 57 | Performed by: PODIATRIST
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
## 2021-11-11 ENCOUNTER — OFFICE VISIT (OUTPATIENT)
Dept: PODIATRY | Facility: CLINIC | Age: 66
End: 2021-11-11
Payer: COMMERCIAL

## 2021-11-11 ENCOUNTER — ANCILLARY PROCEDURE (OUTPATIENT)
Dept: GENERAL RADIOLOGY | Facility: CLINIC | Age: 66
End: 2021-11-11
Attending: PODIATRIST
Payer: COMMERCIAL

## 2021-11-11 VITALS — WEIGHT: 194 LBS | BODY MASS INDEX: 24.91 KG/M2

## 2021-11-11 DIAGNOSIS — S93.401A INVERSION SPRAIN OF RIGHT ANKLE, INITIAL ENCOUNTER: ICD-10-CM

## 2021-11-11 DIAGNOSIS — S92.351A CLOSED FRACTURE OF BASE OF FIFTH METATARSAL BONE OF RIGHT FOOT, INITIAL ENCOUNTER: Primary | ICD-10-CM

## 2021-11-11 DIAGNOSIS — S92.351A CLOSED FRACTURE OF BASE OF FIFTH METATARSAL BONE OF RIGHT FOOT, INITIAL ENCOUNTER: ICD-10-CM

## 2021-11-11 PROBLEM — G25.81 RESTLESS LEG SYNDROME: Status: ACTIVE | Noted: 2021-11-11

## 2021-11-11 PROBLEM — S22.42XA CLOSED FRACTURE OF MULTIPLE RIBS OF LEFT SIDE: Status: ACTIVE | Noted: 2018-08-15

## 2021-11-11 PROBLEM — B35.9 DERMATOPHYTOSIS: Status: ACTIVE | Noted: 2021-11-11

## 2021-11-11 PROBLEM — I10 PRIMARY HYPERTENSION: Status: ACTIVE | Noted: 2021-11-11

## 2021-11-11 PROBLEM — S22.20XA CLOSED FRACTURE OF STERNUM: Status: ACTIVE | Noted: 2018-08-15

## 2021-11-11 PROBLEM — K21.9 GASTROESOPHAGEAL REFLUX DISEASE: Status: ACTIVE | Noted: 2021-11-11

## 2021-11-11 PROBLEM — J93.9 PNEUMOTHORAX ON LEFT: Status: ACTIVE | Noted: 2018-08-15

## 2021-11-11 PROCEDURE — 73630 X-RAY EXAM OF FOOT: CPT | Mod: RT | Performed by: RADIOLOGY

## 2021-11-11 PROCEDURE — 99207 PR FRACTURE CARE IN GLOBAL PERIOD: CPT | Mod: 24 | Performed by: PODIATRIST
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
## 2021-11-11 NOTE — PATIENT INSTRUCTIONS
We wish you continued good healing. If you have any questions or concerns, please do not hesitate to contact us at  [phone]    [employer] (secure e-mail communication and access to your chart) to send a message or to make an appointment.    Please remember to call and schedule a follow up appointment if one was recommended at your earliest convenience.     PODIATRY CLINIC HOURS  TELEPHONE NUMBER    Dr. [facility]        Clinics:  [facility]es Nelson CMA   Tuesday 1PM-6PM  [place]  Wednesday 745AM-330PM  [place]  Thursday/Friday 745AM-230PM  [place] [facility] APPOINT[facility]  [phone]    [place] APPOINTMENTS  [phone]          If you need a medication refill, please contact us you may need lab work and/or a follow up visit prior to your refill (i.e. Antifungal medications).    If MRI needed please call Imaging at [phone] or [phone]    HOW DO I GET MY KNEE SCOOTER? Knee scooters can be picked up at ANY Medical Supply stores with your knee scooter Prescription.  OR    Bring your signed prescription to an [facility] showroom.
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
## 2021-11-12 ENCOUNTER — TRANSFERRED RECORDS (OUTPATIENT)
Dept: HEALTH INFORMATION MANAGEMENT | Facility: CLINIC | Age: 66
End: 2021-11-12
Payer: COMMERCIAL

## 2021-11-12 LAB — RETINOPATHY: POSITIVE

## 2021-11-14 ENCOUNTER — HEALTH MAINTENANCE LETTER (OUTPATIENT)
Age: 66
End: 2021-11-14

## 2021-12-02 ENCOUNTER — ANCILLARY PROCEDURE (OUTPATIENT)
Dept: GENERAL RADIOLOGY | Facility: CLINIC | Age: 66
End: 2021-12-02
Attending: PODIATRIST
Payer: COMMERCIAL

## 2021-12-02 ENCOUNTER — OFFICE VISIT (OUTPATIENT)
Dept: PODIATRY | Facility: CLINIC | Age: 66
End: 2021-12-02
Payer: COMMERCIAL

## 2021-12-02 VITALS — SYSTOLIC BLOOD PRESSURE: 126 MMHG | HEART RATE: 86 BPM | DIASTOLIC BLOOD PRESSURE: 55 MMHG

## 2021-12-02 DIAGNOSIS — S92.351A CLOSED FRACTURE OF BASE OF FIFTH METATARSAL BONE OF RIGHT FOOT, INITIAL ENCOUNTER: Primary | ICD-10-CM

## 2021-12-02 DIAGNOSIS — S92.351A CLOSED FRACTURE OF BASE OF FIFTH METATARSAL BONE OF RIGHT FOOT, INITIAL ENCOUNTER: ICD-10-CM

## 2021-12-02 PROCEDURE — 99207 PR FRACTURE CARE IN GLOBAL PERIOD: CPT | Performed by: PODIATRIST

## 2021-12-02 PROCEDURE — 73630 X-RAY EXAM OF FOOT: CPT | Mod: RT | Performed by: RADIOLOGY

## 2021-12-02 RX ORDER — LANCETS
EACH MISCELLANEOUS
COMMUNITY
Start: 2021-11-16
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
## 2021-12-02 NOTE — PATIENT INSTRUCTIONS
We wish you continued good healing. If you have any questions or concerns, please do not hesitate to contact us at  [phone]    [employer] (secure e-mail communication and access to your chart) to send a message or to make an appointment.    Please remember to call and schedule a follow up appointment if one was recommended at your earliest convenience.     PODIATRY CLINIC HOURS  TELEPHONE NUMBER    Dr. Bruce BLACKPJOCELYN [facility]        Clinics:  Ramses Nelson CMA   Tuesday 1PM-6PM  [place]  Wednesday 745AM-330PM  [place]  Thursday/Friday 745AM-230PM  [place] [facility] APPOINTMENTS  [phone]    [place] APPOINTMENTS  [phone]          If you need a medication refill, please contact us you may need lab work and/or a follow up visit prior to your refill (i.e. Antifungal medications).    If MRI needed please call Imaging at [phone] or [phone]    HOW DO I GET MY KNEE SCOOTER? Knee scooters can be picked up at ANY Medical Supply stores with your knee scooter Prescription.  OR    Bring your signed prescription to an [facility] showroom.
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
## 2022-01-13 ENCOUNTER — OFFICE VISIT (OUTPATIENT)
Dept: PODIATRY | Facility: CLINIC | Age: 67
End: 2022-01-13
Payer: COMMERCIAL

## 2022-01-13 VITALS
SYSTOLIC BLOOD PRESSURE: 107 MMHG | DIASTOLIC BLOOD PRESSURE: 63 MMHG | WEIGHT: 194 LBS | BODY MASS INDEX: 24.91 KG/M2 | HEART RATE: 85 BPM

## 2022-01-13 DIAGNOSIS — S92.351A CLOSED FRACTURE OF BASE OF FIFTH METATARSAL BONE OF RIGHT FOOT, INITIAL ENCOUNTER: Primary | ICD-10-CM

## 2022-01-13 PROCEDURE — 99213 OFFICE O/P EST LOW 20 MIN: CPT | Performed by: PODIATRIST
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
## 2022-01-13 NOTE — PATIENT INSTRUCTIONS
We wish you continued good healing. If you have any questions or concerns, please do not hesitate to contact us at  [phone]    [employer] (secure e-mail communication and access to your chart) to send a message or to make an appointment.    Please remember to call and schedule a follow up appointment if one was recommended at your earliest convenience.     PODIATRY CLINIC HOURS  TELEPHONE NUMBER    Dr. Bruce BLACKPJOCELYN [facility]        Clinics:  [place] CMA   Tuesday 1PM-6PM  [place]  Wednesday 745AM-330PM  [place]  Thursday/Friday 745AM-230PM  [place] [facility]ARES/[facility]ES APPOINTMENTS  [phone]    [place] APPOINTMENTS  [phone]          If you need a medication refill, please contact us you may need lab work and/or a follow up visit prior to your refill (i.e. Antifungal medications).    If MRI needed please call Imaging at [phone] or [phone]    HOW DO I GET MY KNEE SCOOTER? Knee scooters can be picked up at ANY Medical Supply stores with your knee scooter Prescription.  OR    Bring your signed prescription to an [facility] Medical [facility] showroom.

## 2022-06-25 ENCOUNTER — HEALTH MAINTENANCE LETTER (OUTPATIENT)
Age: 67
End: 2022-06-25

## 2022-10-04 PROBLEM — F52.8 OTHER SEXUAL DYSFUNCTION NOT DUE TO A SUBSTANCE OR KNOWN PHYSIOLOGICAL CONDITION: Status: ACTIVE | Noted: 2021-11-11

## 2022-10-31 ENCOUNTER — TRANSFERRED RECORDS (OUTPATIENT)
Dept: HEALTH INFORMATION MANAGEMENT | Facility: CLINIC | Age: 67
End: 2022-10-31

## 2022-10-31 LAB — RETINOPATHY: POSITIVE

## 2022-11-20 ENCOUNTER — HEALTH MAINTENANCE LETTER (OUTPATIENT)
Age: 67
End: 2022-11-20

## 2023-04-15 ENCOUNTER — HEALTH MAINTENANCE LETTER (OUTPATIENT)
Age: 68
End: 2023-04-15

## 2023-11-25 ENCOUNTER — HEALTH MAINTENANCE LETTER (OUTPATIENT)
Age: 68
End: 2023-11-25

## 2024-01-22 ENCOUNTER — TRANSFERRED RECORDS (OUTPATIENT)
Dept: MULTI SPECIALTY CLINIC | Facility: CLINIC | Age: 69
End: 2024-01-22
Payer: COMMERCIAL

## 2024-01-22 LAB — RETINOPATHY: NORMAL

## 2024-01-29 ENCOUNTER — VIRTUAL VISIT (OUTPATIENT)
Dept: FAMILY MEDICINE | Facility: CLINIC | Age: 69
End: 2024-01-29
Payer: COMMERCIAL

## 2024-01-29 DIAGNOSIS — Z86.0100 HISTORY OF COLONIC POLYPS: ICD-10-CM

## 2024-01-29 DIAGNOSIS — G47.00 INSOMNIA, UNSPECIFIED TYPE: ICD-10-CM

## 2024-01-29 DIAGNOSIS — R10.84 ABDOMINAL PAIN, GENERALIZED: Primary | ICD-10-CM

## 2024-01-29 DIAGNOSIS — R53.83 FATIGUE, UNSPECIFIED TYPE: ICD-10-CM

## 2024-01-29 PROCEDURE — 99442 PR PHYSICIAN TELEPHONE EVALUATION 11-20 MIN: CPT | Mod: 93 | Performed by: FAMILY MEDICINE

## 2024-01-29 NOTE — PROGRESS NOTES
José Manuel is a 68 year old who is being evaluated via a billable telephone visit.      What phone number would you like to be contacted at? [phone]  How would you like to obtain your AVS? Simon    Distant Location (provider location):  On-site        Subjective   José Manuel is a 68 year old, presenting for the following health issues:  Insomnia        1/29/2024     8:41 AM   Additional Questions   Roomed by Luz Elena Gomez     Via the Health Maintenance questionnaire, the patient has reported the following services have been completed -Eye Exam, this information has been sent to the abstraction team.  History of Present Illness       Reason for visit:  Insomnia    He eats 2-3 servings of fruits and vegetables daily.He consumes 0 sweetened beverage(s) daily.He exercises with enough effort to increase his heart rate 10 to 19 minutes per day.  He exercises with enough effort to increase his heart rate 4 days per week.   He is taking medications regularly.       Always had insomnia/ sleep issues    The last month only 2-3 hours     Rarely take 1/2 hour nap during day    2nd shift    Off work 11pm    Try to go bed 1 am     Often not sleeping till 6 am    Intermittent diarrhea    Gassy    Having small bm 7-8 x per day    Some diarrhea    No bloody or black stools     Dark brown, very    Tired    [facility]  8.7 hemoglobin a1c       Some diziness / lightheaded    Had heart checked            Objective           Vitals:  No vitals were obtained today due to virtual visit.    Physical Exam   General: Alert and no distress //Respiratory: No audible wheeze, cough, or shortness of breath // Psychiatric:  Appropriate affect, tone, and pace of words      ASSESSMENT / PLAN:  (R10.84) Abdominal pain, generalized  (primary encounter diagnosis)  Comment: patient needs labs done, and also upper and lower scopes. He will schedule.  Be seen promptly if symptoms acutely worsen. 3   Plan: CBC with Platelets & Differential,          Comprehensive metabolic panel, Lipase, Adult GI         Referral - Procedure Only             (R53.83) Fatigue, unspecified type  Comment: check   Plan: TSH with free T4 reflex             (Z86.010) History of colonic polyps  Comment: patient to schedule   Plan: Adult GI  Referral - Procedure Only             (G47.00) Insomnia, unspecified type  Comment: severe.  Patient to see sleep specialist.   Plan: Adult Sleep Eval & Management          Referral               I reviewed the patient's medications, allergies, medical history, family history, and social history.    Tim Soliman MD        Phone call duration: 17 minutes ( 8:59 to 9:16 am )  Signed Electronically by: Tim Soliman MD

## 2024-01-31 ENCOUNTER — HOSPITAL ENCOUNTER (OUTPATIENT)
Facility: AMBULATORY SURGERY CENTER | Age: 69
End: 2024-01-31
Attending: SURGERY | Admitting: SURGERY
Payer: COMMERCIAL

## 2024-01-31 ENCOUNTER — TELEPHONE (OUTPATIENT)
Dept: GASTROENTEROLOGY | Facility: CLINIC | Age: 69
End: 2024-01-31
Payer: COMMERCIAL

## 2024-01-31 DIAGNOSIS — Z86.0100 HISTORY OF COLONIC POLYPS: Primary | ICD-10-CM

## 2024-01-31 NOTE — TELEPHONE ENCOUNTER
"Endoscopy Scheduling Screen    Have you had a positive Covid test in the last 14 days?  No    Are you active on MyChart?   No    What insurance is in the chart?  Other:  bcbs    Ordering/Referring Provider:     YARA QUINONEZ      (If ordering provider performs procedure, schedule with ordering provider unless otherwise instructed. )    BMI: Estimated body mass index is 24.91 kg/m  as calculated from the following:    Height as of 2/12/21: 1.88 m (6' 2\").    Weight as of 1/13/22: 88 kg (194 lb).     Sedation Ordered  moderate sedation.   If patient BMI > 50 do not schedule in [facility].    If patient BMI > 45 do not schedule at [facility].    Are you taking methadone or Suboxone?  No    Have you had difficulties, pain, or discomfort during past endoscopy procedures?  No    Are you taking any prescription medications for pain 3 or more times per week?   NO - No RN review required.    Do you have a history of malignant hyperthermia or adverse reaction to anesthesia?  No    (Females) Are you currently pregnant?   No     Have you been diagnosed or told you have pulmonary hypertension?   No    Do you have an LVAD?  No    Have you been told you have moderate to severe sleep apnea?  No    Have you been told you have COPD, asthma, or any other lung disease?  No    Do you have any heart conditions?  No     Have you ever had an organ transplant?   No    Have you ever had or are you awaiting a heart or lung transplant?   No    Have you had a stroke or transient ischemic attack (TIA aka \"mini stroke\" in the last 6 months?   No    Have you been diagnosed with or been told you have cirrhosis of the liver?   No    Are you currently on dialysis?   No    Do you need assistance transferring?   No    BMI: Estimated body mass index is 24.91 kg/m  as calculated from the following:    Height as of 2/12/21: 1.88 m (6' 2\").    Weight as of 1/13/22: 88 kg (194 lb).     Is patients BMI > 40 and scheduling location [facility]?  No    Do you take an injectable " medication for weight loss or diabetes (excluding insulin)?  Yes, hold time can be up to 7 days. Please check with you prescribing provider for recommendation.   Oxempic  Do you take the medication Naltrexone?  No    Do you take blood thinners?  No       Prep   Are you currently on dialysis or do you have chronic kidney disease?  No    Do you have a diagnosis of diabetes?  Yes (Golytely Prep)    Do you have a diagnosis of cystic fibrosis (CF)?  No    On a regular basis do you go 3 -5 days between bowel movements?  No    BMI > 40?  No    Preferred Pharmacy:        [id number] - [place], [place] - [address] AT [facility] [address]  [address] [facility] [address]  Phone: [phone] Fax: [phone]      Final Scheduling Details   Colonoscopy prep sent?  N/A    Procedure scheduled  Colonoscopy / Upper endoscopy (EGD)    Surgeon:  Tiffany     Date of procedure:  4/2     Pre-OP / PAC:   No - Not required for this site.    Location  [facility] - [facility] - Per order.    Sedation   Moderate Sedation - Per order.      Patient Reminders:   You will receive a call from a Nurse to review instructions and health history.  This assessment must be completed prior to your procedure.  Failure to complete the Nurse assessment may result in the procedure being cancelled.      On the day of your procedure, please designate an adult(s) who can drive you home stay with you for the next 24 hours. The medicines used in the exam will make you sleepy. You will not be able to drive.      You cannot take public transportation, ride share services, or non-medical taxi service without a responsible caregiver.  Medical transport services are allowed with the requirement that a responsible caregiver will receive you at your destination.  We require that drivers and caregivers are confirmed prior to your procedure.

## 2024-02-06 ENCOUNTER — LAB (OUTPATIENT)
Dept: LAB | Facility: CLINIC | Age: 69
End: 2024-02-06
Payer: COMMERCIAL

## 2024-02-06 DIAGNOSIS — R53.83 FATIGUE, UNSPECIFIED TYPE: ICD-10-CM

## 2024-02-06 DIAGNOSIS — R10.84 ABDOMINAL PAIN, GENERALIZED: ICD-10-CM

## 2024-02-06 LAB
ALBUMIN SERPL BCG-MCNC: 4.5 G/DL (ref 3.5–5.2)
ALP SERPL-CCNC: 80 U/L (ref 40–150)
ALT SERPL W P-5'-P-CCNC: 20 U/L (ref 0–70)
ANION GAP SERPL CALCULATED.3IONS-SCNC: 10 MMOL/L (ref 7–15)
AST SERPL W P-5'-P-CCNC: 16 U/L (ref 0–45)
BASOPHILS # BLD AUTO: 0 10E3/UL (ref 0–0.2)
BASOPHILS NFR BLD AUTO: 1 %
BILIRUB SERPL-MCNC: 0.2 MG/DL
BUN SERPL-MCNC: 13.7 MG/DL (ref 8–23)
CALCIUM SERPL-MCNC: 9.8 MG/DL (ref 8.8–10.2)
CHLORIDE SERPL-SCNC: 104 MMOL/L (ref 98–107)
CREAT SERPL-MCNC: 0.76 MG/DL (ref 0.67–1.17)
DEPRECATED HCO3 PLAS-SCNC: 27 MMOL/L (ref 22–29)
EGFRCR SERPLBLD CKD-EPI 2021: >90 ML/MIN/1.73M2
EOSINOPHIL # BLD AUTO: 0.2 10E3/UL (ref 0–0.7)
EOSINOPHIL NFR BLD AUTO: 4 %
ERYTHROCYTE [DISTWIDTH] IN BLOOD BY AUTOMATED COUNT: 12.7 % (ref 10–15)
GLUCOSE SERPL-MCNC: 226 MG/DL (ref 70–99)
HCT VFR BLD AUTO: 45 % (ref 40–53)
HGB BLD-MCNC: 14.8 G/DL (ref 13.3–17.7)
IMM GRANULOCYTES # BLD: 0 10E3/UL
IMM GRANULOCYTES NFR BLD: 0 %
LIPASE SERPL-CCNC: 43 U/L (ref 13–60)
LYMPHOCYTES # BLD AUTO: 1.4 10E3/UL (ref 0.8–5.3)
LYMPHOCYTES NFR BLD AUTO: 34 %
MCH RBC QN AUTO: 30 PG (ref 26.5–33)
MCHC RBC AUTO-ENTMCNC: 32.9 G/DL (ref 31.5–36.5)
MCV RBC AUTO: 91 FL (ref 78–100)
MONOCYTES # BLD AUTO: 0.5 10E3/UL (ref 0–1.3)
MONOCYTES NFR BLD AUTO: 12 %
NEUTROPHILS # BLD AUTO: 2 10E3/UL (ref 1.6–8.3)
NEUTROPHILS NFR BLD AUTO: 49 %
PLATELET # BLD AUTO: 253 10E3/UL (ref 150–450)
POTASSIUM SERPL-SCNC: 4.7 MMOL/L (ref 3.4–5.3)
PROT SERPL-MCNC: 7 G/DL (ref 6.4–8.3)
RBC # BLD AUTO: 4.93 10E6/UL (ref 4.4–5.9)
SODIUM SERPL-SCNC: 141 MMOL/L (ref 135–145)
TSH SERPL DL<=0.005 MIU/L-ACNC: 1.74 UIU/ML (ref 0.3–4.2)
WBC # BLD AUTO: 4 10E3/UL (ref 4–11)

## 2024-02-06 PROCEDURE — 84443 ASSAY THYROID STIM HORMONE: CPT

## 2024-02-06 PROCEDURE — 85025 COMPLETE CBC W/AUTO DIFF WBC: CPT

## 2024-02-06 PROCEDURE — 83690 ASSAY OF LIPASE: CPT

## 2024-02-06 PROCEDURE — 80053 COMPREHEN METABOLIC PANEL: CPT

## 2024-02-06 PROCEDURE — 36415 COLL VENOUS BLD VENIPUNCTURE: CPT

## 2024-02-07 NOTE — RESULT ENCOUNTER NOTE
These labs are normal except for high glucose.    Lipase is normal, so no pancreatitis.    Tim Soliman MD

## 2024-03-15 RX ORDER — BISACODYL 5 MG/1
TABLET, DELAYED RELEASE ORAL
Qty: 4 TABLET | Refills: 0 | Status: SHIPPED | OUTPATIENT
Start: 2024-03-15

## 2024-03-15 NOTE — TELEPHONE ENCOUNTER
Standard Golytely Bowel Prep. Instructions were sent via [employer]. Bowel prep was sent 3/15/2024 to      [id number] - [place], [place] - [address] AT [address].      Elvia Espinoza RN on 3/15/2024 at 11:05 AM

## 2024-03-20 ENCOUNTER — TELEPHONE (OUTPATIENT)
Dept: GASTROENTEROLOGY | Facility: CLINIC | Age: 69
End: 2024-03-20
Payer: COMMERCIAL

## 2024-03-20 DIAGNOSIS — Z86.0100 HISTORY OF COLONIC POLYPS: Primary | ICD-10-CM

## 2024-03-20 NOTE — TELEPHONE ENCOUNTER
Patient is scheduled for a Colonoscopy/Upper endoscopy (EGD)  Date of procedure: 4/2/24  Location:     Upon review patient with DM. A1C lab >10.  Lab Results   Component Value Date    A1C 11.1 02/12/2021     Patient will need to be rescheduled to a hospital setting due to uncontrolled DM.     Writer will send to endoscopy scheduling to facilitate with rescheduling.       Thank you,   Julieta Rahman RN  Endoscopy Procedure Pre Assessment RN  [phone] option 4

## 2024-03-20 NOTE — TELEPHONE ENCOUNTER
Caller: José Manuel    Reason for Reschedule/Cancellation   (please be detailed, any staff messages or encounters to note?): patient needs hospital setting due to uncontrolled DM per inbasket message.       Prior to reschedule please review:  Ordering Provider: Janny  Sedation Determined: moderate  Does patient have any ASC Exclusions, please identify?: y per inbasket message uncontrolled DM       Notes on Cancelled Procedure:  Procedure: Upper and Lower Endoscopy [EGD and Colonoscopy]   Date: 04/02/2024  Location: [facility]; [address] [place] [address], [address]nd Floor, [place] [address]   Surgeon: Suhas      Rescheduled: NO       Did you cancel or rescheduled an EUS procedure? No.

## 2024-03-22 NOTE — TELEPHONE ENCOUNTER
Rescheduled: Yes,   Procedure: Upper and Lower Endoscopy [EGD and Colonoscopy]    Date: 07/15/2024   Location: [facility]; [address] [facility], 3rd Floor, [place] [address]    Surgeon: zach Kearney   Sedation Level Scheduled  moderate ,  Reason for Sedation Level per order   Instructions updated and sent: y -Mail patient preference     Does patient need PAC or Pre -Op Rescheduled? : no       Did you cancel or rescheduled an EUS procedure? No.

## 2024-06-17 RX ORDER — BISACODYL 5 MG/1
TABLET, DELAYED RELEASE ORAL
Qty: 4 TABLET | Refills: 0 | Status: SHIPPED | OUTPATIENT
Start: 2024-06-17

## 2024-06-20 ENCOUNTER — TELEPHONE (OUTPATIENT)
Dept: FAMILY MEDICINE | Facility: CLINIC | Age: 69
End: 2024-06-20
Payer: COMMERCIAL

## 2024-06-20 NOTE — LETTER
June 20, 2024    To  José Manuel Jernigan  [address]  [facility] [address]    Your team at [facility] cares about your health. We have reviewed your chart and based on our findings; we are making the following recommendations to better manage your health.     You are in particular need of attention regarding the following:     Schedule a DIABETIC FOLLOW UP appointment for Office Visit. Patients with diabetes should see their provider regularly.  PREVENTATIVE VISIT: Annual Medicare Wellness:Schedule an Annual Medicare Wellness Exam. Please call your [facility] clinic to set up your appointment.    If you have already completed these items, please contact the clinic via phone or   MyChart so your care team can review and update your records. Thank you for   choosing [facility] Clinics for your healthcare needs. For any questions,   concerns, or to schedule an appointment please contact our clinic.    Healthy Regards,      Your [facility] Care Team

## 2024-06-20 NOTE — TELEPHONE ENCOUNTER
Patient Quality Outreach    Patient is due for the following:   Diabetes -  A1C, LDL (Fasting), Microalbumin, and Foot Exam  Physical Annual Wellness Visit    Next Steps:   Schedule a Annual Wellness Visit    Type of outreach:    Sent letter.    Next Steps:  Reach out within 90 days via Phone.    Max number of attempts reached: Yes. Will try again in 90 days if patient still on fail list.    Questions for provider review:    None           Luz Elena Gomez, [facility]  Chart routed to chart closed.

## 2024-06-22 ENCOUNTER — HEALTH MAINTENANCE LETTER (OUTPATIENT)
Age: 69
End: 2024-06-22

## 2024-07-02 ENCOUNTER — TELEPHONE (OUTPATIENT)
Dept: GASTROENTEROLOGY | Facility: CLINIC | Age: 69
End: 2024-07-02
Payer: COMMERCIAL

## 2024-07-02 NOTE — TELEPHONE ENCOUNTER
Caller: Writer    Reason for Reschedule/Cancellation   (please be detailed, any staff messages or encounters to note?): Provider protocol change.       Prior to reschedule please review:  Ordering Provider: Tim Soliman MD   Sedation Determined: Moderate  Does patient have any ASC Exclusions, please identify?: Yes, Uncontrolled Diabetes per Clinical review      Notes on Cancelled Procedure:  Procedure: Upper and Lower Endoscopy [EGD and Colonoscopy]   Date: 07/15  Location: [facility]; [address] [place] [address], 3rd Floor, [place] [address]   Surgeon: Randy Kearney      Rescheduled: No, Left message to return call    Please see if patient can come in 07/10 [facility] with [facility]ery ( Spot held) or reschedule next available Hosp

## 2024-07-03 NOTE — TELEPHONE ENCOUNTER
Caller: José Manuel Jernigan   Reason for Reschedule/Cancellation (please be detailed, any staff messages or encounters to note?):     Provider protocol change       Rescheduled: Yes   Procedure: Upper and Lower Endoscopy [EGD and Colonoscopy]  Date: 08/07/2024  Location: [facility]; [address] [place] [address], 3rd Floor, [place] [address]   Surgeon: Noe   Sedation Level Scheduled  moderate          Reason for Sedation Level per order   Prep/Instructions updated and sent: Per pt prefers letter     Does patient need PAC or Pre -Op Rescheduled? : n       Send In - basket message to Panc - Jorge Pool if EUS procedure is canceled or rescheduled: [ N/A, YES or NO] n

## 2024-07-23 NOTE — TELEPHONE ENCOUNTER
7/23/2024    Filled paperwork for Dupixent, signed by Salma, faxed to [employer].    Patient calling for results of imaging. Please advise of recommendations.     Ortho appointment next Thurs is scheduled.     Patient mychart not working.     Nicky Amado RN

## 2024-07-30 ENCOUNTER — TELEPHONE (OUTPATIENT)
Dept: GASTROENTEROLOGY | Facility: CLINIC | Age: 69
End: 2024-07-30

## 2024-07-30 NOTE — TELEPHONE ENCOUNTER
Attempted to contact patient in order to complete pre assessment questions.     No answer. Left message to return call to [phone] option 4    Callback required communication sent via   due to last login to [employer] was 2021 .      Procedure details:    Patient scheduled for Colonoscopy/Upper endoscopy (EGD) on 8.7.24.     Arrival time: 0700. Procedure time 0800    Facility location: [facility]; [address] [place] [address], 3rd Floor, [place] [address]. Check in location: Main entrance at registration desk.    Sedation type: Conscious sedation     Pre op exam needed? No.    Indication for procedure:   Abdominal pain, generalized [R10.84]  - Primary      History of colonic polyps [Z86.010]            Chart review:     Electronic implanted devices? No    Recent diagnosis of diverticulitis within the last 6 weeks? No      Medication review:    Diabetic? Yes. Diabetic medication HOLDING recommendations: Oral diabetic medications: Jardiance (empagliflozin): HOLD  3 days before procedure.   Diabetic injectables: Ozempic (Semaglutide). Weekly dosing of medication.  Hold 7 days before procedure.  Follow up with managing provider.   Insulin: Consult with managing provider     Anticoagulants? No    Weight loss medication/injectable? No.    NSAIDS? No    Other medication HOLDING recommendations:  N/A      Prep for procedure:     Bowel prep recommendation: Standard Golytely. Bowel prep prescription sent to      [id number] - [place], [place] - [address] AT [facility] OF [address]  Due to: diabetes.     Prep instructions sent via letter.       Letitia Gomez RN  Endoscopy Procedure Pre Assessment

## 2024-08-01 NOTE — TELEPHONE ENCOUNTER
Second call attempt to complete pre assessment.     No answer.  Left message to return call to [phone] #4 by next business day prior to 4PM or procedure will be sent to cancel.     Callback required communication sent via voicemail due to [employer] inactive.      Concha Pinedo RN  Endoscopy Procedure Pre Assessment

## 2024-08-02 NOTE — TELEPHONE ENCOUNTER
Pre assessment completed for upcoming procedure.   (Please see previous telephone encounter notes for complete details)    Patient  returned call.       Procedure details:    Arrival time and facility location reviewed.    Pre op exam needed? No.    Designated  policy reviewed. Instructed to have someone stay 6  hours post procedure.       Medication review:    Medications reviewed. Please see supporting documentation below. Holding recommendations discussed (if applicable).   Oral diabetic medication(s): Jardiance (empagliflozin): HOLD  3 days before procedure.  Injectable diabetic medication(s): Ozempic (Semaglutide). Weekly dosing of medication.  Hold 7 days before procedure.  Follow up with managing provider.       Prep for procedure:     Procedure prep instructions reviewed.        Any additional information needed:  N/A      Patient  verbalized understanding and had no questions or concerns at this time.      Concha Pinedo RN  Endoscopy Procedure Pre Assessment   [phone] option 4

## 2024-08-07 ENCOUNTER — HOSPITAL ENCOUNTER (OUTPATIENT)
Facility: CLINIC | Age: 69
Discharge: HOME | End: 2024-08-07
Attending: INTERNAL MEDICINE | Admitting: INTERNAL MEDICINE
Payer: COMMERCIAL

## 2024-08-07 VITALS
RESPIRATION RATE: 16 BRPM | DIASTOLIC BLOOD PRESSURE: 81 MMHG | SYSTOLIC BLOOD PRESSURE: 138 MMHG | HEART RATE: 89 BPM | OXYGEN SATURATION: 99 %

## 2024-08-07 LAB
COLONOSCOPY: NORMAL
GLUCOSE BLDC GLUCOMTR-MCNC: 238 MG/DL (ref 70–99)
UPPER GI ENDOSCOPY: NORMAL

## 2024-08-07 PROCEDURE — 88305 TISSUE EXAM BY PATHOLOGIST: CPT | Mod: 26 | Performed by: STUDENT IN AN ORGANIZED HEALTH CARE EDUCATION/TRAINING PROGRAM

## 2024-08-07 PROCEDURE — G0500 MOD SEDAT ENDO SERVICE >5YRS: HCPCS | Mod: PT | Performed by: INTERNAL MEDICINE

## 2024-08-07 PROCEDURE — 258N000003 HC RX IP 258 OP 636: Performed by: INTERNAL MEDICINE

## 2024-08-07 PROCEDURE — 88305 TISSUE EXAM BY PATHOLOGIST: CPT | Mod: TC | Performed by: INTERNAL MEDICINE

## 2024-08-07 PROCEDURE — 250N000009 HC RX 250: Performed by: INTERNAL MEDICINE

## 2024-08-07 PROCEDURE — 99153 MOD SED SAME PHYS/QHP EA: CPT | Performed by: INTERNAL MEDICINE

## 2024-08-07 PROCEDURE — 43239 EGD BIOPSY SINGLE/MULTIPLE: CPT | Performed by: INTERNAL MEDICINE

## 2024-08-07 PROCEDURE — 250N000011 HC RX IP 250 OP 636: Performed by: INTERNAL MEDICINE

## 2024-08-07 PROCEDURE — 45385 COLONOSCOPY W/LESION REMOVAL: CPT | Performed by: INTERNAL MEDICINE

## 2024-08-07 PROCEDURE — 82962 GLUCOSE BLOOD TEST: CPT

## 2024-08-07 RX ORDER — FENTANYL CITRATE 50 UG/ML
INJECTION, SOLUTION INTRAMUSCULAR; INTRAVENOUS PRN
Status: DISCONTINUED | OUTPATIENT
Start: 2024-08-07 | End: 2024-08-07 | Stop reason: HOSPADM

## 2024-08-07 ASSESSMENT — ACTIVITIES OF DAILY LIVING (ADL)
ADLS_ACUITY_SCORE: 35

## 2024-08-07 NOTE — PRE-PROCEDURE
ENDOSCOPY PRE-SEDATION H&P FOR OUTPATIENT PROCEDURES    José Manuel Jernigan  [id number]  1955    Procedure:  Colonoscopy Endoscopy    Pre-procedure diagnosis:   Abdominal pain   Polyp surveillance     Past medical history:   Past Medical History:   Diagnosis Date    Diabetes (H)      Patient Active Problem List   Diagnosis    History of diabetes mellitus    Type 2 diabetes mellitus without complication, with long-term current use of insulin (H)    Hyperlipidemia LDL goal <100    Type 2 diabetes mellitus with diabetic polyneuropathy, with long-term current use of insulin (H)    Restless leg syndrome    Psychosexual dysfunction with inhibited sexual excitement    Primary hypertension    Pneumothorax on left    Obstructive jaundice (H28)    Gastroesophageal reflux disease    Dermatophytosis    Closed fracture of sternum    Closed fracture of multiple ribs of left side       Past surgical history:   Past Surgical History:   Procedure Laterality Date    COLONOSCOPY N/A 10/18/2017    Procedure: COMBINED COLONOSCOPY, SINGLE OR MULTIPLE BIOPSY/POLYPECTOMY BY BIOPSY;;  Surgeon: Duane, William Charles, MD;  Location: [facility] OR    COLONOSCOPY WITH CO2 INSUFFLATION N/A 10/18/2017    Procedure: COLONOSCOPY WITH CO2 INSUFFLATION;  Colonscopy,Dr. Soliman, Screen for colon cancer, BMI 23.9, [facility];  Surgeon: Duane, William Charles, MD;  Location: [facility] OR    ENT SURGERY      Tonsillectomy       No current facility-administered medications for this encounter.       Allergies   Allergen Reactions    Metformin Diarrhea       History of Anesthesia/Sedation Problems: no    Physical Exam:    Mental status: alert  Heart: Normal  Lung: Normal  Assessment of patient's airway: Normal  Other as pertinent for procedure: None     Lab Results   Component Value Date    WBC 4.0 02/06/2024    WBC 5.0 02/12/2021     Lab Results   Component Value Date    RBC 4.93 02/06/2024    RBC 5.06 02/12/2021     Lab Results   Component Value Date    HGB  "14.8 02/06/2024    HGB 15.0 02/12/2021     Lab Results   Component Value Date    HCT 45.0 02/06/2024    HCT 45.7 02/12/2021     Lab Results   Component Value Date    MCV 91 02/06/2024    MCV 90 02/12/2021     Lab Results   Component Value Date    MCH 30.0 02/06/2024    MCH 29.6 02/12/2021     Lab Results   Component Value Date    MCHC 32.9 02/06/2024    MCHC 32.8 02/12/2021     Lab Results   Component Value Date    RDW 12.7 02/06/2024    RDW 13.7 02/12/2021     Lab Results   Component Value Date     02/06/2024     02/12/2021     No results found for: \"INR\"     ASA Score: See Provation note    Mallampati score:  II - Faucial pillars and soft palate may be seen, but uvula is masked by the base of the tongue    Assessment/Plan:     The patient is an appropriate candidate to receive sedation.    Informed consent was discussed with the patient/family, including the risks, benefits, potential complications and any alternative options associated with sedation.    Patient assessment completed just prior to sedation and while under constant observation by the provider. Condition determined to be adequate for proceeding with sedation.    The specific risks for the procedure were discussed with the patient at the time of informed consent and include but are not limited to perforation which could require surgery, missing significant neoplasm or lesion, hemorrhage and adverse sedative complication.      THADDEUS NOBLE MD                "

## 2024-08-07 NOTE — OR NURSING
Pt underwent EGD with biopsies and colonoscopy with polypectomy under conscious sedation. Specimens: sent to lab. Pt transferred to recovery and report given to magaly COLLADO.       Elaine Lloyd RN

## 2024-08-09 LAB
PATH REPORT.COMMENTS IMP SPEC: NORMAL
PATH REPORT.COMMENTS IMP SPEC: NORMAL
PATH REPORT.FINAL DX SPEC: NORMAL
PATH REPORT.GROSS SPEC: NORMAL
PATH REPORT.MICROSCOPIC SPEC OTHER STN: NORMAL
PATH REPORT.RELEVANT HX SPEC: NORMAL
PHOTO IMAGE: NORMAL

## 2024-08-12 ENCOUNTER — TRANSFERRED RECORDS (OUTPATIENT)
Dept: HEALTH INFORMATION MANAGEMENT | Facility: CLINIC | Age: 69
End: 2024-08-12
Payer: COMMERCIAL

## 2024-08-12 LAB — RETINOPATHY: POSITIVE

## 2024-08-12 NOTE — RESULT ENCOUNTER NOTE
"Mr. Jernigan -     I am writing to let you know the pathology results from the polyps that were removed at the time of your colonoscopy.  The polyps returned as \"adenomatous polyps\".  An adenoma is a type of benign polyp. If left in place for years, adenomas have a small risk of developing cancer.There was no cancer in your polyps.  Your polyps were completely removed. However, you are at risk to grow more adenomatous polyps in the future.      Because of the number of adenomatous polyps that you had, I recommend that you repeat a colonoscopy to screen for new polyps in three (3) years.  I suggest that you discuss this with your primary care physician or provider at that time.    At the time of your upper GI endoscopy, we took biopsies of your stomach. These did not show any significant abnormalities.    Finally, due to the number of polyps, please make sure to let any first degree family members know that you had an adenomatous polyp as it may affect when they have a colonoscopy.  If you have any questions, please don't hesitate to contact the Gastroenterology nurse at [phone].       Davion Liu MD  Professor of Medicine  Division of Gastroenterology, Hepatology, and Nutrition  [facility] "

## 2024-08-21 ENCOUNTER — PATIENT OUTREACH (OUTPATIENT)
Dept: GASTROENTEROLOGY | Facility: CLINIC | Age: 69
End: 2024-08-21
Payer: COMMERCIAL

## 2025-01-04 ENCOUNTER — HEALTH MAINTENANCE LETTER (OUTPATIENT)
Age: 70
End: 2025-01-04

## 2025-01-09 ENCOUNTER — TELEPHONE (OUTPATIENT)
Dept: FAMILY MEDICINE | Facility: CLINIC | Age: 70
End: 2025-01-09
Payer: COMMERCIAL

## 2025-01-09 NOTE — TELEPHONE ENCOUNTER
Patient Quality Outreach    Patient is due for the following:   Diabetes -  A1C, LDL (Fasting), Microalbumin, Diabetic Follow-Up Visit, and Foot Exam  Physical Annual Wellness Visit    Action(s) Taken:   Schedule a Annual Wellness Visit    Type of outreach:    Sent letter.    Questions for provider review:    None           Luz Elena Gomez, [facility]  Chart routed to chart closed.

## 2025-01-09 NOTE — LETTER
January 9, 2025    To  José Manuel Jernigan  [address]  [place] [address]    Your team at [facility] cares about your health. We have reviewed your chart and based on our findings; we are making the following recommendations to better manage your health.     You are in particular need of attention regarding the following:     PREVENTATIVE VISIT: Annual Medicare Wellness:Schedule an Annual Medicare Wellness Exam. Please call your [facility] clinic to set up your appointment.    If you have already completed these items, please contact the clinic via phone or   MyChart so your care team can review and update your records. Thank you for   choosing [facility] for your healthcare needs. For any questions,   concerns, or to schedule an appointment please contact our clinic.    Healthy Regards,      Your [facility] Care Team            Electronically signed

## 2025-07-12 ENCOUNTER — HEALTH MAINTENANCE LETTER (OUTPATIENT)
Age: 70
End: 2025-07-12

## (undated) DEVICE — SOL WATER IRRIG 1000ML BOTTLE 07139-09

## (undated) RX ORDER — FENTANYL CITRATE 50 UG/ML
INJECTION, SOLUTION INTRAMUSCULAR; INTRAVENOUS
Status: DISPENSED
Start: 2017-10-18

## (undated) RX ORDER — DIPHENHYDRAMINE HYDROCHLORIDE 50 MG/ML
INJECTION INTRAMUSCULAR; INTRAVENOUS
Status: DISPENSED
Start: 2024-08-07

## (undated) RX ORDER — FENTANYL CITRATE 50 UG/ML
INJECTION, SOLUTION INTRAMUSCULAR; INTRAVENOUS
Status: DISPENSED
Start: 2024-08-07